# Patient Record
Sex: MALE | Race: WHITE | NOT HISPANIC OR LATINO | Employment: PART TIME | URBAN - METROPOLITAN AREA
[De-identification: names, ages, dates, MRNs, and addresses within clinical notes are randomized per-mention and may not be internally consistent; named-entity substitution may affect disease eponyms.]

---

## 2021-03-28 ENCOUNTER — HOSPITAL (OUTPATIENT)
Facility: CLINIC | Age: 71
End: 2021-03-28

## 2021-03-28 ENCOUNTER — HOSPITAL ENCOUNTER (EMERGENCY)
Facility: CLINIC | Age: 71
Discharge: ANOTHER HOSPITAL | End: 2021-03-28
Attending: EMERGENCY MEDICINE | Admitting: EMERGENCY MEDICINE
Payer: COMMERCIAL

## 2021-03-28 ENCOUNTER — APPOINTMENT (OUTPATIENT)
Dept: CT IMAGING | Facility: CLINIC | Age: 71
End: 2021-03-28
Attending: EMERGENCY MEDICINE
Payer: COMMERCIAL

## 2021-03-28 VITALS
SYSTOLIC BLOOD PRESSURE: 132 MMHG | RESPIRATION RATE: 26 BRPM | WEIGHT: 257 LBS | OXYGEN SATURATION: 91 % | HEIGHT: 69 IN | HEART RATE: 98 BPM | BODY MASS INDEX: 38.06 KG/M2 | DIASTOLIC BLOOD PRESSURE: 89 MMHG | TEMPERATURE: 98.9 F

## 2021-03-28 DIAGNOSIS — I26.09 ACUTE PULMONARY EMBOLISM WITH ACUTE COR PULMONALE, UNSPECIFIED PULMONARY EMBOLISM TYPE (H): ICD-10-CM

## 2021-03-28 LAB
ANION GAP SERPL CALCULATED.3IONS-SCNC: 9 MMOL/L (ref 3–14)
APTT PPP: 30 SEC (ref 22–37)
BASOPHILS # BLD AUTO: 0.1 10E9/L (ref 0–0.2)
BASOPHILS NFR BLD AUTO: 0.7 %
BUN SERPL-MCNC: 18 MG/DL (ref 7–30)
CALCIUM SERPL-MCNC: 8.4 MG/DL (ref 8.5–10.1)
CHLORIDE SERPL-SCNC: 105 MMOL/L (ref 94–109)
CO2 SERPL-SCNC: 25 MMOL/L (ref 20–32)
CREAT BLD-MCNC: 1.6 MG/DL (ref 0.66–1.25)
CREAT SERPL-MCNC: 1.51 MG/DL (ref 0.66–1.25)
D DIMER PPP FEU-MCNC: 13.3 UG/ML FEU (ref 0–0.5)
DIFFERENTIAL METHOD BLD: NORMAL
EOSINOPHIL # BLD AUTO: 0.5 10E9/L (ref 0–0.7)
EOSINOPHIL NFR BLD AUTO: 4.9 %
ERYTHROCYTE [DISTWIDTH] IN BLOOD BY AUTOMATED COUNT: 12.7 % (ref 10–15)
FLUAV RNA RESP QL NAA+PROBE: NEGATIVE
FLUBV RNA RESP QL NAA+PROBE: NEGATIVE
GFR SERPL CREATININE-BSD FRML MDRD: 43 ML/MIN/{1.73_M2}
GFR SERPL CREATININE-BSD FRML MDRD: 46 ML/MIN/{1.73_M2}
GLUCOSE SERPL-MCNC: 111 MG/DL (ref 70–99)
HCT VFR BLD AUTO: 43.4 % (ref 40–53)
HGB BLD-MCNC: 14.4 G/DL (ref 13.3–17.7)
IMM GRANULOCYTES # BLD: 0 10E9/L (ref 0–0.4)
IMM GRANULOCYTES NFR BLD: 0.3 %
LABORATORY COMMENT REPORT: NORMAL
LYMPHOCYTES # BLD AUTO: 2.8 10E9/L (ref 0.8–5.3)
LYMPHOCYTES NFR BLD AUTO: 26.5 %
MCH RBC QN AUTO: 32.2 PG (ref 26.5–33)
MCHC RBC AUTO-ENTMCNC: 33.2 G/DL (ref 31.5–36.5)
MCV RBC AUTO: 97 FL (ref 78–100)
MONOCYTES # BLD AUTO: 0.8 10E9/L (ref 0–1.3)
MONOCYTES NFR BLD AUTO: 7.5 %
NEUTROPHILS # BLD AUTO: 6.3 10E9/L (ref 1.6–8.3)
NEUTROPHILS NFR BLD AUTO: 60.1 %
NRBC # BLD AUTO: 0 10*3/UL
NRBC BLD AUTO-RTO: 0 /100
PLATELET # BLD AUTO: 187 10E9/L (ref 150–450)
POTASSIUM SERPL-SCNC: 3.8 MMOL/L (ref 3.4–5.3)
RADIOLOGIST FLAGS: ABNORMAL
RBC # BLD AUTO: 4.47 10E12/L (ref 4.4–5.9)
RSV RNA SPEC QL NAA+PROBE: NORMAL
SARS-COV-2 RNA RESP QL NAA+PROBE: NEGATIVE
SODIUM SERPL-SCNC: 139 MMOL/L (ref 133–144)
SPECIMEN SOURCE: NORMAL
TROPONIN I SERPL-MCNC: 0.06 UG/L (ref 0–0.04)
WBC # BLD AUTO: 10.5 10E9/L (ref 4–11)

## 2021-03-28 PROCEDURE — 85730 THROMBOPLASTIN TIME PARTIAL: CPT | Performed by: EMERGENCY MEDICINE

## 2021-03-28 PROCEDURE — 84484 ASSAY OF TROPONIN QUANT: CPT | Performed by: EMERGENCY MEDICINE

## 2021-03-28 PROCEDURE — 250N000011 HC RX IP 250 OP 636: Performed by: EMERGENCY MEDICINE

## 2021-03-28 PROCEDURE — 250N000013 HC RX MED GY IP 250 OP 250 PS 637: Performed by: EMERGENCY MEDICINE

## 2021-03-28 PROCEDURE — 87636 SARSCOV2 & INF A&B AMP PRB: CPT | Performed by: EMERGENCY MEDICINE

## 2021-03-28 PROCEDURE — 85379 FIBRIN DEGRADATION QUANT: CPT | Performed by: EMERGENCY MEDICINE

## 2021-03-28 PROCEDURE — 96374 THER/PROPH/DIAG INJ IV PUSH: CPT | Mod: 59

## 2021-03-28 PROCEDURE — 99285 EMERGENCY DEPT VISIT HI MDM: CPT | Mod: 25

## 2021-03-28 PROCEDURE — 250N000009 HC RX 250: Performed by: EMERGENCY MEDICINE

## 2021-03-28 PROCEDURE — C9803 HOPD COVID-19 SPEC COLLECT: HCPCS

## 2021-03-28 PROCEDURE — 80048 BASIC METABOLIC PNL TOTAL CA: CPT | Performed by: EMERGENCY MEDICINE

## 2021-03-28 PROCEDURE — 85025 COMPLETE CBC W/AUTO DIFF WBC: CPT | Performed by: EMERGENCY MEDICINE

## 2021-03-28 PROCEDURE — 82565 ASSAY OF CREATININE: CPT

## 2021-03-28 PROCEDURE — 71275 CT ANGIOGRAPHY CHEST: CPT

## 2021-03-28 PROCEDURE — 93005 ELECTROCARDIOGRAM TRACING: CPT

## 2021-03-28 RX ORDER — IOPAMIDOL 755 MG/ML
500 INJECTION, SOLUTION INTRAVASCULAR ONCE
Status: COMPLETED | OUTPATIENT
Start: 2021-03-28 | End: 2021-03-28

## 2021-03-28 RX ORDER — LEVOTHYROXINE SODIUM 75 UG/1
TABLET ORAL
COMMUNITY
Start: 2020-08-25

## 2021-03-28 RX ORDER — LISINOPRIL 20 MG/1
20 TABLET ORAL
COMMUNITY
Start: 2020-10-09 | End: 2021-10-09

## 2021-03-28 RX ORDER — TADALAFIL 5 MG/1
5 TABLET ORAL
Status: ON HOLD | COMMUNITY
Start: 2020-04-08 | End: 2021-03-29

## 2021-03-28 RX ORDER — ASPIRIN 81 MG/1
324 TABLET, CHEWABLE ORAL ONCE
Status: COMPLETED | OUTPATIENT
Start: 2021-03-28 | End: 2021-03-28

## 2021-03-28 RX ORDER — HEPARIN SODIUM 10000 [USP'U]/100ML
0-5000 INJECTION, SOLUTION INTRAVENOUS CONTINUOUS
Status: DISCONTINUED | OUTPATIENT
Start: 2021-03-28 | End: 2021-03-29 | Stop reason: HOSPADM

## 2021-03-28 RX ADMIN — HEPARIN SODIUM 1800 UNITS/HR: 10000 INJECTION, SOLUTION INTRAVENOUS at 20:17

## 2021-03-28 RX ADMIN — IOPAMIDOL 80 ML: 755 INJECTION, SOLUTION INTRAVENOUS at 19:29

## 2021-03-28 RX ADMIN — SODIUM CHLORIDE 90 ML: 9 INJECTION, SOLUTION INTRAVENOUS at 19:29

## 2021-03-28 RX ADMIN — ASPIRIN 324 MG: 81 TABLET, CHEWABLE ORAL at 19:02

## 2021-03-28 ASSESSMENT — ENCOUNTER SYMPTOMS
LIGHT-HEADEDNESS: 1
NAUSEA: 0
COUGH: 0
APPETITE CHANGE: 1
VOMITING: 0
ABDOMINAL PAIN: 0
FATIGUE: 1
HEADACHES: 1
FEVER: 0
SHORTNESS OF BREATH: 1

## 2021-03-28 ASSESSMENT — MIFFLIN-ST. JEOR: SCORE: 1911.12

## 2021-03-28 NOTE — ED PROVIDER NOTES
History     Chief Complaint:  Shortness of Breath and Syncope     HPI   Brian Bowers is a 71 year old male with history of hypertension, ANCA, psoriasis, GERD, prostate cancer, who presents for evaluation of shortness of breath and syncope. The patient reports that 4 days ago he started to develop fatigue and possibly mild shortness of breath. However, he states that yesterday he really started to feel unwell and his shortness of breath was more prominent. He was walking outside down some steps when he felt lightheaded and that he could no longer walk. He laid down and reportedly had a syncopal event for 2 minutes. EMS were called and when they evaluated him, he was tachycardic to 115 and had oxygen saturations at 92%. They recommended that he be evaluated in the ED at that time but he refused. The patient endorses decreased appetite and today, he has contineud to experience worsening shortness of breath, lightheadedness, and a mild pain to his left chest that he describes as dull. The patient endorses a headache earlier today but this is now resolving. He feels that his shortness of breath worsens with ambulation. The patient denies fever, nausea, vomiting, abdominal pain, cough, leg pain or swelling, urinary changes, or loss taste/smell. He has not had COVID-19 but reports that he received both his vaccinations with the last on March 11. There is no family history of PE/DVT and he does not take blood thinners.     Review of Systems   Constitutional: Positive for appetite change and fatigue. Negative for fever.   HENT:        No loss taste/smell   Respiratory: Positive for shortness of breath. Negative for cough.    Cardiovascular: Positive for chest pain. Negative for leg swelling.   Gastrointestinal: Negative for abdominal pain, nausea and vomiting.   Genitourinary:        No urinary changes   Musculoskeletal:        No leg pain   Neurological: Positive for syncope, light-headedness and headaches.   All  "other systems reviewed and are negative.      Allergies:  No Known Allergies    Medications:  Cialis   Viagra   Levothyroxine   Lisinopril   Omeprazole    Past Medical History:    GERD  Psoriasis   Anal fissure   ANCA  Prostate cancer   Obesity   hypogonadism   Hypertension  Erectile dysfunction   Basal cell carcinoma   dyspepsia     Past Surgical History:    Vasectomy  Colonoscopy  Prostate biopsy   Tonsillectomy  Hernia repair     Family History:    Father: prostate cancer  Mother: arthritis, emphysema     Social History:  The patient was unaccompanied to the ED.  Alcohol Use: Positive; occasional   PCP: [facility]    Physical Exam     Patient Vitals for the past 24 hrs:   BP Temp Pulse Resp SpO2 Height Weight   03/28/21 2245 (!) 117/95 -- 89 -- 90 % -- --   03/28/21 2215 (!) 134/94 -- 91 -- 90 % -- --   03/28/21 2200 (!) 141/94 -- 89 -- 91 % -- --   03/28/21 2145 (!) 132/97 -- 91 -- 91 % -- --   03/28/21 2130 (!) 129/97 -- 93 -- 91 % -- --   03/28/21 2115 (!) 149/91 -- 93 -- 91 % -- --   03/28/21 2100 -- -- 92 -- 91 % -- --   03/28/21 2045 (!) 165/108 -- 94 -- 93 % -- --   03/28/21 2030 (!) 166/114 -- 92 -- 94 % -- --   03/28/21 2015 (!) 166/110 -- 96 -- 93 % -- --   03/28/21 2000 (!) 162/101 -- 99 -- 94 % -- --   03/28/21 1958 -- -- -- -- -- 1.753 m (5' 9\") 116.6 kg (257 lb)   03/28/21 1945 (!) 144/96 -- 98 -- 93 % -- --   03/28/21 1941 -- -- 103 -- 95 % -- --   03/28/21 1940 -- -- 108 -- 91 % -- --   03/28/21 1938 (!) 174/98 -- 105 -- 90 % -- --   03/28/21 1915 126/83 -- 105 -- 91 % -- --   03/28/21 1900 125/79 -- 102 -- 91 % -- --   03/28/21 1845 120/81 -- 106 -- 92 % -- --   03/28/21 1825 124/82 98.9  F (37.2  C) 135 26 95 % -- --       Physical Exam    HENT:    Mouth/Throat: Oropharynx is without swelling or erythema. Oral mucosa moist.    Eyes: Conjunctivae are normal. No scleral icterus.  Neck: Neck supple. No cervical adenopathy  Cardiovascular: Tachycardic but regular rhythm and intact distal pulses.  "   Pulmonary/Chest: Increased respiratory rate. Increased work of breathing. Speaking in long phrases.  Lungs clear.   Abdominal: Soft.  No distension. There is no tenderness.   Musculoskeletal:  No edema, No calf tenderness  Neurological:Alert and answering questions appropriately. Coordination normal.   Skin: Skin is warm and dry.   Psychiatric: Normal mood and affect.     Emergency Department Course     ECG:  ECG taken at 1832, ECG read at 1845  Sinus tachycardia  Inferior infarct, age undetermined   Abnormal ECG  Rate 112 bpm. NM interval 192 ms. QRS duration 74 ms. QT/QTc 326/444 ms. P-R-T axes 33 -3 9.    Imaging:    CT Chest Pulmonary Embolism w Contrast  Abnormal  1.  Pulmonary embolism is present, large clot burden with evidence for elevated right heart pressure.  2.  Large hiatal hernia.      [Critical Result: Pulmonary embolism]    Finding was identified on 3/28/2021 7:43 PM.     Dr. Ponce was contacted by me on 3/28/2021 7:55 PM and verbalized understanding of the critical result.   Reading per radiology     Laboratory:    Symptomatic Influenza A/B & SARS-CoV2 (COVID-19) Virus PCR Multiplex: Negative       CBC: WBC 10.5, HGB 14.4,   BMP: Glucose 111 (H), GFR 46 (L), Calcium 8.4 (L)o/w WNL (Creatinine 1.51 (H))    Troponin (Collected 1837): 0.062 (H)    D Dimer: 13.3 (H)     Partial Thromboplastin Time: 30     Creatinine POCT: Creatinine 1.6 (H), GFR 43 (L)    Emergency Department Course:    Reviewed:    I reviewed the patient's nursing notes, vitals, past medical records, Care Everywhere.     Assessments:    1832 I performed an exam of the patient as documented above.     1958 Patient rechecked and updated.      Consults:     1951 I spoke with Dr. Wood of the radiology service from [facility] regarding patient's presentation, findings, and plan of care.     1956 I spoke with Dr. Howell of the IR service regarding patient's presentation, findings, and plan of care.     2005 I spoke  with Dr. Howell of the IR service regarding patient's presentation, findings, and plan of care.     2036 I spoke with Dr. Cortez of the hospitalist service from [facility] regarding patient's presentation, findings, and plan of care.     2141 I spoke with Dr. Butler of the hospitalist service from [facility] regarding patient's presentation, findings, and plan of care.     Interventions:  1902 Aspirin 324 mg PO  2016 Heparin 8,000 units IV  2017 Heparin 25,000 units in 0.45% NaCl, 1,800 units IV    Disposition:  The patient was transferred to [facility] via EMS. The patient was admitted under the care of Dr. Butler.     Impression & Plan          Covid-19  Brian Bowers was evaluated during a global COVID-19 pandemic, which necessitated consideration that the patient might be at risk for infection with the SARS-CoV-2 virus that causes COVID-19.   Applicable protocols for evaluation were followed during the patient's care.   COVID-19 was considered as part of the patient's evaluation. The plan for testing is:  a test was obtained during this visit.    Medical Decision Making:  Brian Bowers is a 71 year old male who presents to the emergency department today for evaluation of tachycardia, shortness of breath, and an episode of syncope yesterday. On arrival, he was tachycardic with normal oxygen saturations and signs of increased work of breathing. IV was placed, labs were drawn and sent, and EKG was obtained without findings found concerning for acute ischemia. He was exhibited to CT for CT pulmonary angiogram which revealed findings as noted above with significant clot burden and some elevated right heart pressures. He did have some decline in his oxygen saturations here but has now been stable on 2 L by nasal canula. He has remained hemodynamically stable he was heparinized. I consulted with Dr. Howell of the IR service to determined his appropriateness for embolectomy. She received images and  thought he was a possible candidate but did not feel it needed to be done emergently. Unfortunately, there was no available bed at [facility] for him to be transferred and we eventually found a bed at the [facility]. He has been accepted for admission under Dr. Butler. He will transferred via ambulance for direct admission.         Diagnosis:    ICD-10-CM    1. Acute pulmonary embolism with acute cor pulmonale, unspecified pulmonary embolism type (H)  I26.09 Partial thromboplastin time     Scribe Disclosure:  I, Orla Severson, am serving as a scribe at 6:29 PM on 3/28/2021 to document services personally performed by Renetta Ponce MD based on my observations and the provider's statements to me.     [facility] EMERGENCY DEPT         Renetta Ponce MD  03/29/21 0209

## 2021-03-28 NOTE — ED TRIAGE NOTES
Patient presents with complaints of SOB which started last night. Patient states he also had a syncopal episode last night as well. Patient state he is still having SOB today. ABC intact without need for intervention at this time.

## 2021-03-29 ENCOUNTER — APPOINTMENT (OUTPATIENT)
Dept: GENERAL RADIOLOGY | Facility: CLINIC | Age: 71
DRG: 164 | End: 2021-03-29
Attending: PHYSICIAN ASSISTANT
Payer: COMMERCIAL

## 2021-03-29 ENCOUNTER — HOSPITAL ENCOUNTER (INPATIENT)
Facility: CLINIC | Age: 71
LOS: 2 days | Discharge: HOME | DRG: 164 | End: 2021-03-31
Attending: STUDENT IN AN ORGANIZED HEALTH CARE EDUCATION/TRAINING PROGRAM | Admitting: INTERNAL MEDICINE
Payer: COMMERCIAL

## 2021-03-29 ENCOUNTER — APPOINTMENT (OUTPATIENT)
Dept: ULTRASOUND IMAGING | Facility: CLINIC | Age: 71
DRG: 164 | End: 2021-03-29
Attending: INTERNAL MEDICINE
Payer: COMMERCIAL

## 2021-03-29 ENCOUNTER — APPOINTMENT (OUTPATIENT)
Dept: INTERVENTIONAL RADIOLOGY/VASCULAR | Facility: CLINIC | Age: 71
DRG: 164 | End: 2021-03-29
Attending: STUDENT IN AN ORGANIZED HEALTH CARE EDUCATION/TRAINING PROGRAM
Payer: COMMERCIAL

## 2021-03-29 ENCOUNTER — APPOINTMENT (OUTPATIENT)
Dept: CARDIOLOGY | Facility: CLINIC | Age: 71
DRG: 164 | End: 2021-03-29
Attending: STUDENT IN AN ORGANIZED HEALTH CARE EDUCATION/TRAINING PROGRAM
Payer: COMMERCIAL

## 2021-03-29 DIAGNOSIS — I26.99 PE (PULMONARY THROMBOEMBOLISM) (H): Primary | ICD-10-CM

## 2021-03-29 DIAGNOSIS — K21.00 GASTROESOPHAGEAL REFLUX DISEASE WITH ESOPHAGITIS, UNSPECIFIED WHETHER HEMORRHAGE: ICD-10-CM

## 2021-03-29 LAB
ABO + RH BLD: NORMAL
ABO + RH BLD: NORMAL
ALBUMIN SERPL-MCNC: 2.7 G/DL (ref 3.4–5)
ALP SERPL-CCNC: 49 U/L (ref 40–150)
ALT SERPL W P-5'-P-CCNC: 27 U/L (ref 0–70)
ANION GAP SERPL CALCULATED.3IONS-SCNC: 7 MMOL/L (ref 3–14)
ANION GAP SERPL CALCULATED.3IONS-SCNC: 9 MMOL/L (ref 3–14)
ANION GAP SERPL CALCULATED.3IONS-SCNC: 9 MMOL/L (ref 3–14)
APTT PPP: 107 SEC (ref 22–37)
APTT PPP: 131 SEC (ref 22–37)
AST SERPL W P-5'-P-CCNC: 25 U/L (ref 0–45)
BILIRUB DIRECT SERPL-MCNC: <0.1 MG/DL (ref 0–0.2)
BILIRUB SERPL-MCNC: 0.5 MG/DL (ref 0.2–1.3)
BLD GP AB SCN SERPL QL: NORMAL
BLOOD BANK CMNT PATIENT-IMP: NORMAL
BUN SERPL-MCNC: 11 MG/DL (ref 7–30)
BUN SERPL-MCNC: 19 MG/DL (ref 7–30)
BUN SERPL-MCNC: 25 MG/DL (ref 7–30)
CALCIUM SERPL-MCNC: 8.1 MG/DL (ref 8.5–10.1)
CALCIUM SERPL-MCNC: 8.3 MG/DL (ref 8.5–10.1)
CALCIUM SERPL-MCNC: 8.8 MG/DL (ref 8.5–10.1)
CHLORIDE SERPL-SCNC: 106 MMOL/L (ref 94–109)
CHLORIDE SERPL-SCNC: 107 MMOL/L (ref 94–109)
CHLORIDE SERPL-SCNC: 108 MMOL/L (ref 94–109)
CO2 SERPL-SCNC: 20 MMOL/L (ref 20–32)
CO2 SERPL-SCNC: 22 MMOL/L (ref 20–32)
CO2 SERPL-SCNC: 25 MMOL/L (ref 20–32)
CREAT SERPL-MCNC: 0.77 MG/DL (ref 0.66–1.25)
CREAT SERPL-MCNC: 1.26 MG/DL (ref 0.66–1.25)
CREAT SERPL-MCNC: 1.49 MG/DL (ref 0.66–1.25)
ERYTHROCYTE [DISTWIDTH] IN BLOOD BY AUTOMATED COUNT: 12.9 % (ref 10–15)
ERYTHROCYTE [DISTWIDTH] IN BLOOD BY AUTOMATED COUNT: 12.9 % (ref 10–15)
ERYTHROCYTE [DISTWIDTH] IN BLOOD BY AUTOMATED COUNT: 13 % (ref 10–15)
GFR SERPL CREATININE-BSD FRML MDRD: 47 ML/MIN/{1.73_M2}
GFR SERPL CREATININE-BSD FRML MDRD: 57 ML/MIN/{1.73_M2}
GFR SERPL CREATININE-BSD FRML MDRD: >90 ML/MIN/{1.73_M2}
GLUCOSE SERPL-MCNC: 107 MG/DL (ref 70–99)
GLUCOSE SERPL-MCNC: 116 MG/DL (ref 70–99)
GLUCOSE SERPL-MCNC: 193 MG/DL (ref 70–99)
HCT VFR BLD AUTO: 35.9 % (ref 40–53)
HCT VFR BLD AUTO: 39.8 % (ref 40–53)
HCT VFR BLD AUTO: 41 % (ref 40–53)
HGB BLD-MCNC: 10.2 G/DL (ref 13.3–17.7)
HGB BLD-MCNC: 11.5 G/DL (ref 13.3–17.7)
HGB BLD-MCNC: 11.6 G/DL (ref 13.3–17.7)
HGB BLD-MCNC: 12.5 G/DL (ref 13.3–17.7)
HGB BLD-MCNC: 13.3 G/DL (ref 13.3–17.7)
INR PPP: 1.3 (ref 0.86–1.14)
INR PPP: 1.34 (ref 0.86–1.14)
INTERPRETATION ECG - MUSE: NORMAL
INTERPRETATION ECG - MUSE: NORMAL
KCT BLD-ACNC: 140 SEC (ref 75–150)
KCT BLD-ACNC: 233 SEC (ref 75–150)
KCT BLD-ACNC: 302 SEC (ref 75–150)
LACTATE BLD-SCNC: 1.1 MMOL/L (ref 0.7–2)
LACTATE BLD-SCNC: 2 MMOL/L (ref 0.7–2)
LACTATE BLD-SCNC: 2.5 MMOL/L (ref 0.7–2)
LACTATE BLD-SCNC: 3.9 MMOL/L (ref 0.7–2)
MAGNESIUM SERPL-MCNC: 2.1 MG/DL (ref 1.6–2.3)
MCH RBC QN AUTO: 31.4 PG (ref 26.5–33)
MCH RBC QN AUTO: 31.6 PG (ref 26.5–33)
MCH RBC QN AUTO: 31.8 PG (ref 26.5–33)
MCHC RBC AUTO-ENTMCNC: 31.4 G/DL (ref 31.5–36.5)
MCHC RBC AUTO-ENTMCNC: 32.3 G/DL (ref 31.5–36.5)
MCHC RBC AUTO-ENTMCNC: 32.4 G/DL (ref 31.5–36.5)
MCV RBC AUTO: 101 FL (ref 78–100)
MCV RBC AUTO: 97 FL (ref 78–100)
MCV RBC AUTO: 98 FL (ref 78–100)
NT-PROBNP SERPL-MCNC: 1263 PG/ML (ref 0–900)
PHOSPHATE SERPL-MCNC: 3 MG/DL (ref 2.5–4.5)
PLATELET # BLD AUTO: 157 10E9/L (ref 150–450)
PLATELET # BLD AUTO: 172 10E9/L (ref 150–450)
PLATELET # BLD AUTO: 180 10E9/L (ref 150–450)
POTASSIUM SERPL-SCNC: 3.2 MMOL/L (ref 3.4–5.3)
POTASSIUM SERPL-SCNC: 3.8 MMOL/L (ref 3.4–5.3)
POTASSIUM SERPL-SCNC: 4 MMOL/L (ref 3.4–5.3)
PROT SERPL-MCNC: 6.5 G/DL (ref 6.8–8.8)
RADIOLOGIST FLAGS: ABNORMAL
RBC # BLD AUTO: 3.67 10E12/L (ref 4.4–5.9)
RBC # BLD AUTO: 3.93 10E12/L (ref 4.4–5.9)
RBC # BLD AUTO: 4.23 10E12/L (ref 4.4–5.9)
SODIUM SERPL-SCNC: 137 MMOL/L (ref 133–144)
SODIUM SERPL-SCNC: 138 MMOL/L (ref 133–144)
SODIUM SERPL-SCNC: 139 MMOL/L (ref 133–144)
SPECIMEN EXP DATE BLD: NORMAL
TROPONIN I SERPL-MCNC: 0.06 UG/L (ref 0–0.04)
TROPONIN I SERPL-MCNC: 0.12 UG/L (ref 0–0.04)
TROPONIN I SERPL-MCNC: 0.17 UG/L (ref 0–0.04)
TROPONIN I SERPL-MCNC: 0.62 UG/L (ref 0–0.04)
UFH PPP CHRO-ACNC: 0.53 IU/ML
UFH PPP CHRO-ACNC: 0.66 IU/ML
UFH PPP CHRO-ACNC: >1.1 IU/ML
UFH PPP CHRO-ACNC: >1.1 IU/ML
WBC # BLD AUTO: 13.1 10E9/L (ref 4–11)
WBC # BLD AUTO: 15.4 10E9/L (ref 4–11)
WBC # BLD AUTO: 9.1 10E9/L (ref 4–11)

## 2021-03-29 PROCEDURE — 999N000127 HC STATISTIC PERIPHERAL IV START W US GUIDANCE

## 2021-03-29 PROCEDURE — 99152 MOD SED SAME PHYS/QHP 5/>YRS: CPT

## 2021-03-29 PROCEDURE — 37185 PRIM ART M-THRMBC SBSQ VSL: CPT | Mod: XS | Performed by: RADIOLOGY

## 2021-03-29 PROCEDURE — 93970 EXTREMITY STUDY: CPT | Mod: 26 | Performed by: RADIOLOGY

## 2021-03-29 PROCEDURE — 83605 ASSAY OF LACTIC ACID: CPT | Performed by: INTERNAL MEDICINE

## 2021-03-29 PROCEDURE — 93321 DOPPLER ECHO F-UP/LMTD STD: CPT | Mod: 26 | Performed by: INTERNAL MEDICINE

## 2021-03-29 PROCEDURE — 02CR3ZZ EXTIRPATION OF MATTER FROM LEFT PULMONARY ARTERY, PERCUTANEOUS APPROACH: ICD-10-PCS | Performed by: RADIOLOGY

## 2021-03-29 PROCEDURE — C1769 GUIDE WIRE: HCPCS

## 2021-03-29 PROCEDURE — 71045 X-RAY EXAM CHEST 1 VIEW: CPT | Mod: 26 | Performed by: RADIOLOGY

## 2021-03-29 PROCEDURE — 272N000563 HC SHEATH CR14

## 2021-03-29 PROCEDURE — 86900 BLOOD TYPING SEROLOGIC ABO: CPT | Performed by: INTERNAL MEDICINE

## 2021-03-29 PROCEDURE — 93970 EXTREMITY STUDY: CPT

## 2021-03-29 PROCEDURE — 37184 PRIM ART M-THRMBC 1ST VSL: CPT | Mod: 50 | Performed by: RADIOLOGY

## 2021-03-29 PROCEDURE — 250N000009 HC RX 250: Performed by: STUDENT IN AN ORGANIZED HEALTH CARE EDUCATION/TRAINING PROGRAM

## 2021-03-29 PROCEDURE — 93010 ELECTROCARDIOGRAM REPORT: CPT | Performed by: INTERNAL MEDICINE

## 2021-03-29 PROCEDURE — 84484 ASSAY OF TROPONIN QUANT: CPT | Performed by: PHYSICIAN ASSISTANT

## 2021-03-29 PROCEDURE — 36015 PLACE CATHETER IN ARTERY: CPT | Mod: RT

## 2021-03-29 PROCEDURE — 272N000569 HC SHEATH CR6

## 2021-03-29 PROCEDURE — 99153 MOD SED SAME PHYS/QHP EA: CPT

## 2021-03-29 PROCEDURE — 36415 COLL VENOUS BLD VENIPUNCTURE: CPT | Performed by: INTERNAL MEDICINE

## 2021-03-29 PROCEDURE — 272N000567 HC SHEATH CR4

## 2021-03-29 PROCEDURE — 71045 X-RAY EXAM CHEST 1 VIEW: CPT

## 2021-03-29 PROCEDURE — 83735 ASSAY OF MAGNESIUM: CPT | Performed by: INTERNAL MEDICINE

## 2021-03-29 PROCEDURE — 37184 PRIM ART M-THRMBC 1ST VSL: CPT | Mod: 50

## 2021-03-29 PROCEDURE — 272N000192 HC ACCESSORY CR2

## 2021-03-29 PROCEDURE — 80076 HEPATIC FUNCTION PANEL: CPT | Performed by: INTERNAL MEDICINE

## 2021-03-29 PROCEDURE — 85520 HEPARIN ASSAY: CPT | Performed by: INTERNAL MEDICINE

## 2021-03-29 PROCEDURE — 272N000504 HC NEEDLE CR4

## 2021-03-29 PROCEDURE — 83605 ASSAY OF LACTIC ACID: CPT | Performed by: PHYSICIAN ASSISTANT

## 2021-03-29 PROCEDURE — C1757 CATH, THROMBECTOMY/EMBOLECT: HCPCS

## 2021-03-29 PROCEDURE — 99223 1ST HOSP IP/OBS HIGH 75: CPT | Mod: AI | Performed by: INTERNAL MEDICINE

## 2021-03-29 PROCEDURE — 02CQ3ZZ EXTIRPATION OF MATTER FROM RIGHT PULMONARY ARTERY, PERCUTANEOUS APPROACH: ICD-10-PCS | Performed by: RADIOLOGY

## 2021-03-29 PROCEDURE — 93005 ELECTROCARDIOGRAM TRACING: CPT

## 2021-03-29 PROCEDURE — 120N000003 HC R&B IMCU UMMC

## 2021-03-29 PROCEDURE — 83880 ASSAY OF NATRIURETIC PEPTIDE: CPT | Performed by: INTERNAL MEDICINE

## 2021-03-29 PROCEDURE — 250N000013 HC RX MED GY IP 250 OP 250 PS 637: Performed by: NURSE PRACTITIONER

## 2021-03-29 PROCEDURE — 255N000002 HC RX 255 OP 636: Performed by: RADIOLOGY

## 2021-03-29 PROCEDURE — 258N000003 HC RX IP 258 OP 636: Performed by: PHYSICIAN ASSISTANT

## 2021-03-29 PROCEDURE — 93308 TTE F-UP OR LMTD: CPT | Mod: 26 | Performed by: INTERNAL MEDICINE

## 2021-03-29 PROCEDURE — 85027 COMPLETE CBC AUTOMATED: CPT | Performed by: INTERNAL MEDICINE

## 2021-03-29 PROCEDURE — 999N000157 HC STATISTIC RCP TIME EA 10 MIN

## 2021-03-29 PROCEDURE — C9113 INJ PANTOPRAZOLE SODIUM, VIA: HCPCS | Performed by: INTERNAL MEDICINE

## 2021-03-29 PROCEDURE — 85018 HEMOGLOBIN: CPT | Performed by: PHYSICIAN ASSISTANT

## 2021-03-29 PROCEDURE — 250N000011 HC RX IP 250 OP 636: Performed by: INTERNAL MEDICINE

## 2021-03-29 PROCEDURE — 99152 MOD SED SAME PHYS/QHP 5/>YRS: CPT | Mod: GC | Performed by: RADIOLOGY

## 2021-03-29 PROCEDURE — 272N000149 HC KIT CR9

## 2021-03-29 PROCEDURE — 250N000011 HC RX IP 250 OP 636: Performed by: STUDENT IN AN ORGANIZED HEALTH CARE EDUCATION/TRAINING PROGRAM

## 2021-03-29 PROCEDURE — 99207 PR APP CREDIT; MD BILLING SHARED VISIT: CPT | Performed by: INTERNAL MEDICINE

## 2021-03-29 PROCEDURE — 272N000566 HC SHEATH CR3

## 2021-03-29 PROCEDURE — 272N000143 HC KIT CR3

## 2021-03-29 PROCEDURE — 36415 COLL VENOUS BLD VENIPUNCTURE: CPT | Performed by: PHYSICIAN ASSISTANT

## 2021-03-29 PROCEDURE — 86901 BLOOD TYPING SEROLOGIC RH(D): CPT | Performed by: INTERNAL MEDICINE

## 2021-03-29 PROCEDURE — 258N000003 HC RX IP 258 OP 636: Performed by: INTERNAL MEDICINE

## 2021-03-29 PROCEDURE — C1887 CATHETER, GUIDING: HCPCS

## 2021-03-29 PROCEDURE — 93325 DOPPLER ECHO COLOR FLOW MAPG: CPT | Mod: 26 | Performed by: INTERNAL MEDICINE

## 2021-03-29 PROCEDURE — 85018 HEMOGLOBIN: CPT | Performed by: INTERNAL MEDICINE

## 2021-03-29 PROCEDURE — 255N000002 HC RX 255 OP 636: Performed by: INTERNAL MEDICINE

## 2021-03-29 PROCEDURE — 272N000508 HC NEEDLE CR8

## 2021-03-29 PROCEDURE — 99222 1ST HOSP IP/OBS MODERATE 55: CPT | Mod: GC | Performed by: INTERNAL MEDICINE

## 2021-03-29 PROCEDURE — 75743 ARTERY X-RAYS LUNGS: CPT

## 2021-03-29 PROCEDURE — 80048 BASIC METABOLIC PNL TOTAL CA: CPT | Performed by: INTERNAL MEDICINE

## 2021-03-29 PROCEDURE — 250N000013 HC RX MED GY IP 250 OP 250 PS 637: Performed by: INTERNAL MEDICINE

## 2021-03-29 PROCEDURE — 76937 US GUIDE VASCULAR ACCESS: CPT | Mod: 26 | Performed by: RADIOLOGY

## 2021-03-29 PROCEDURE — 99207 PR APP CREDIT; MD BILLING SHARED VISIT: CPT | Performed by: PHYSICIAN ASSISTANT

## 2021-03-29 PROCEDURE — 36015 PLACE CATHETER IN ARTERY: CPT | Mod: XS | Performed by: RADIOLOGY

## 2021-03-29 PROCEDURE — 250N000011 HC RX IP 250 OP 636: Performed by: RADIOLOGY

## 2021-03-29 PROCEDURE — 85730 THROMBOPLASTIN TIME PARTIAL: CPT | Performed by: INTERNAL MEDICINE

## 2021-03-29 PROCEDURE — 75774 ARTERY X-RAY EACH VESSEL: CPT | Mod: LT

## 2021-03-29 PROCEDURE — 86850 RBC ANTIBODY SCREEN: CPT | Performed by: INTERNAL MEDICINE

## 2021-03-29 PROCEDURE — 99223 1ST HOSP IP/OBS HIGH 75: CPT | Mod: 25 | Performed by: STUDENT IN AN ORGANIZED HEALTH CARE EDUCATION/TRAINING PROGRAM

## 2021-03-29 PROCEDURE — 84100 ASSAY OF PHOSPHORUS: CPT | Performed by: INTERNAL MEDICINE

## 2021-03-29 PROCEDURE — 999N000208 ECHOCARDIOGRAM LIMITED

## 2021-03-29 PROCEDURE — 85610 PROTHROMBIN TIME: CPT | Performed by: INTERNAL MEDICINE

## 2021-03-29 PROCEDURE — 85347 COAGULATION TIME ACTIVATED: CPT

## 2021-03-29 PROCEDURE — 84484 ASSAY OF TROPONIN QUANT: CPT | Performed by: INTERNAL MEDICINE

## 2021-03-29 PROCEDURE — 36014 PLACE CATHETER IN ARTERY: CPT | Mod: 50

## 2021-03-29 RX ORDER — AMOXICILLIN 250 MG
1 CAPSULE ORAL AT BEDTIME
Status: DISCONTINUED | OUTPATIENT
Start: 2021-03-29 | End: 2021-03-31 | Stop reason: HOSPADM

## 2021-03-29 RX ORDER — LIDOCAINE 40 MG/G
CREAM TOPICAL
Status: DISCONTINUED | OUTPATIENT
Start: 2021-03-29 | End: 2021-03-31 | Stop reason: HOSPADM

## 2021-03-29 RX ORDER — NALOXONE HYDROCHLORIDE 0.4 MG/ML
0.4 INJECTION, SOLUTION INTRAMUSCULAR; INTRAVENOUS; SUBCUTANEOUS
Status: DISCONTINUED | OUTPATIENT
Start: 2021-03-29 | End: 2021-03-31 | Stop reason: HOSPADM

## 2021-03-29 RX ORDER — SODIUM CHLORIDE, SODIUM LACTATE, POTASSIUM CHLORIDE, CALCIUM CHLORIDE 600; 310; 30; 20 MG/100ML; MG/100ML; MG/100ML; MG/100ML
INJECTION, SOLUTION INTRAVENOUS CONTINUOUS
Status: DISCONTINUED | OUTPATIENT
Start: 2021-03-29 | End: 2021-03-29

## 2021-03-29 RX ORDER — NALOXONE HYDROCHLORIDE 0.4 MG/ML
0.2 INJECTION, SOLUTION INTRAMUSCULAR; INTRAVENOUS; SUBCUTANEOUS
Status: DISCONTINUED | OUTPATIENT
Start: 2021-03-29 | End: 2021-03-29 | Stop reason: HOSPADM

## 2021-03-29 RX ORDER — ACETAMINOPHEN 325 MG/1
650 TABLET ORAL EVERY 4 HOURS PRN
Status: DISCONTINUED | OUTPATIENT
Start: 2021-03-29 | End: 2021-03-31 | Stop reason: HOSPADM

## 2021-03-29 RX ORDER — ONDANSETRON 2 MG/ML
4 INJECTION INTRAMUSCULAR; INTRAVENOUS EVERY 6 HOURS PRN
Status: DISCONTINUED | OUTPATIENT
Start: 2021-03-29 | End: 2021-03-31 | Stop reason: HOSPADM

## 2021-03-29 RX ORDER — OXYCODONE HYDROCHLORIDE 5 MG/1
5-10 TABLET ORAL EVERY 4 HOURS PRN
Status: DISCONTINUED | OUTPATIENT
Start: 2021-03-29 | End: 2021-03-31 | Stop reason: HOSPADM

## 2021-03-29 RX ORDER — NALOXONE HYDROCHLORIDE 0.4 MG/ML
0.2 INJECTION, SOLUTION INTRAMUSCULAR; INTRAVENOUS; SUBCUTANEOUS
Status: DISCONTINUED | OUTPATIENT
Start: 2021-03-29 | End: 2021-03-31 | Stop reason: HOSPADM

## 2021-03-29 RX ORDER — IODIXANOL 320 MG/ML
150 INJECTION, SOLUTION INTRAVASCULAR ONCE
Status: COMPLETED | OUTPATIENT
Start: 2021-03-29 | End: 2021-03-29

## 2021-03-29 RX ORDER — LEVOTHYROXINE SODIUM 75 UG/1
75 TABLET ORAL
Status: DISCONTINUED | OUTPATIENT
Start: 2021-03-29 | End: 2021-03-31 | Stop reason: HOSPADM

## 2021-03-29 RX ORDER — HEPARIN SODIUM 1000 [USP'U]/ML
500-6000 INJECTION, SOLUTION INTRAVENOUS; SUBCUTANEOUS
Status: COMPLETED | OUTPATIENT
Start: 2021-03-29 | End: 2021-03-29

## 2021-03-29 RX ORDER — FENTANYL CITRATE 50 UG/ML
25-50 INJECTION, SOLUTION INTRAMUSCULAR; INTRAVENOUS EVERY 5 MIN PRN
Status: DISCONTINUED | OUTPATIENT
Start: 2021-03-29 | End: 2021-03-29 | Stop reason: HOSPADM

## 2021-03-29 RX ORDER — EMOLLIENT BASE
CREAM (GRAM) TOPICAL EVERY 6 HOURS PRN
Status: DISCONTINUED | OUTPATIENT
Start: 2021-03-29 | End: 2021-03-31 | Stop reason: HOSPADM

## 2021-03-29 RX ORDER — LANOLIN ALCOHOL/MO/W.PET/CERES
3 CREAM (GRAM) TOPICAL
Status: DISCONTINUED | OUTPATIENT
Start: 2021-03-29 | End: 2021-03-30

## 2021-03-29 RX ORDER — POLYETHYLENE GLYCOL 3350 17 G/17G
17 POWDER, FOR SOLUTION ORAL DAILY PRN
Status: DISCONTINUED | OUTPATIENT
Start: 2021-03-29 | End: 2021-03-31 | Stop reason: HOSPADM

## 2021-03-29 RX ORDER — NALOXONE HYDROCHLORIDE 0.4 MG/ML
0.4 INJECTION, SOLUTION INTRAMUSCULAR; INTRAVENOUS; SUBCUTANEOUS
Status: DISCONTINUED | OUTPATIENT
Start: 2021-03-29 | End: 2021-03-29 | Stop reason: HOSPADM

## 2021-03-29 RX ORDER — ONDANSETRON 4 MG/1
4 TABLET, ORALLY DISINTEGRATING ORAL EVERY 6 HOURS PRN
Status: DISCONTINUED | OUTPATIENT
Start: 2021-03-29 | End: 2021-03-31 | Stop reason: HOSPADM

## 2021-03-29 RX ORDER — ACETAMINOPHEN 500 MG
500 TABLET ORAL EVERY 6 HOURS PRN
Status: DISCONTINUED | OUTPATIENT
Start: 2021-03-29 | End: 2021-03-29

## 2021-03-29 RX ORDER — HEPARIN SODIUM 200 [USP'U]/100ML
1 INJECTION, SOLUTION INTRAVENOUS CONTINUOUS PRN
Status: DISCONTINUED | OUTPATIENT
Start: 2021-03-29 | End: 2021-03-29 | Stop reason: HOSPADM

## 2021-03-29 RX ORDER — LISINOPRIL 10 MG/1
20 TABLET ORAL DAILY
Status: DISCONTINUED | OUTPATIENT
Start: 2021-03-30 | End: 2021-03-31 | Stop reason: HOSPADM

## 2021-03-29 RX ORDER — FLUMAZENIL 0.1 MG/ML
0.2 INJECTION, SOLUTION INTRAVENOUS
Status: DISCONTINUED | OUTPATIENT
Start: 2021-03-29 | End: 2021-03-29 | Stop reason: HOSPADM

## 2021-03-29 RX ORDER — HEPARIN SODIUM 1000 [USP'U]/ML
7000 INJECTION, SOLUTION INTRAVENOUS; SUBCUTANEOUS
Status: COMPLETED | OUTPATIENT
Start: 2021-03-29 | End: 2021-03-29

## 2021-03-29 RX ORDER — HEPARIN SODIUM 10000 [USP'U]/100ML
0-5000 INJECTION, SOLUTION INTRAVENOUS CONTINUOUS
Status: DISCONTINUED | OUTPATIENT
Start: 2021-03-29 | End: 2021-03-30

## 2021-03-29 RX ADMIN — HEPARIN SODIUM 1 BAG: 200 INJECTION, SOLUTION INTRAVENOUS at 03:05

## 2021-03-29 RX ADMIN — ONDANSETRON 4 MG: 2 INJECTION INTRAMUSCULAR; INTRAVENOUS at 08:06

## 2021-03-29 RX ADMIN — HEPARIN SODIUM 1800 UNITS/HR: 10000 INJECTION, SOLUTION INTRAVENOUS at 09:50

## 2021-03-29 RX ADMIN — MIDAZOLAM 1 MG: 1 INJECTION INTRAMUSCULAR; INTRAVENOUS at 04:14

## 2021-03-29 RX ADMIN — SODIUM CHLORIDE, POTASSIUM CHLORIDE, SODIUM LACTATE AND CALCIUM CHLORIDE: 600; 310; 30; 20 INJECTION, SOLUTION INTRAVENOUS at 05:04

## 2021-03-29 RX ADMIN — DOCUSATE SODIUM 50 MG AND SENNOSIDES 8.6 MG 1 TABLET: 8.6; 5 TABLET, FILM COATED ORAL at 20:56

## 2021-03-29 RX ADMIN — IODIXANOL 150 ML: 320 INJECTION, SOLUTION INTRAVASCULAR at 04:32

## 2021-03-29 RX ADMIN — FENTANYL CITRATE 50 MCG: 50 INJECTION, SOLUTION INTRAMUSCULAR; INTRAVENOUS at 02:56

## 2021-03-29 RX ADMIN — HEPARIN SODIUM 7000 UNITS: 1000 INJECTION INTRAVENOUS; SUBCUTANEOUS at 03:01

## 2021-03-29 RX ADMIN — HUMAN ALBUMIN MICROSPHERES AND PERFLUTREN 6 ML: 10; .22 INJECTION, SOLUTION INTRAVENOUS at 11:03

## 2021-03-29 RX ADMIN — FENTANYL CITRATE 50 MCG: 50 INJECTION, SOLUTION INTRAMUSCULAR; INTRAVENOUS at 04:14

## 2021-03-29 RX ADMIN — HEPARIN SODIUM 1800 UNITS/HR: 10000 INJECTION, SOLUTION INTRAVENOUS at 01:08

## 2021-03-29 RX ADMIN — HEPARIN SODIUM 7000 UNITS: 1000 INJECTION INTRAVENOUS; SUBCUTANEOUS at 03:40

## 2021-03-29 RX ADMIN — PANTOPRAZOLE SODIUM 40 MG: 40 INJECTION, POWDER, FOR SOLUTION INTRAVENOUS at 20:26

## 2021-03-29 RX ADMIN — FENTANYL CITRATE 50 MCG: 50 INJECTION, SOLUTION INTRAMUSCULAR; INTRAVENOUS at 02:40

## 2021-03-29 RX ADMIN — SODIUM CHLORIDE, POTASSIUM CHLORIDE, SODIUM LACTATE AND CALCIUM CHLORIDE 1000 ML: 600; 310; 30; 20 INJECTION, SOLUTION INTRAVENOUS at 09:46

## 2021-03-29 RX ADMIN — LIDOCAINE HYDROCHLORIDE 10 ML: 10 INJECTION, SOLUTION EPIDURAL; INFILTRATION; INTRACAUDAL; PERINEURAL at 02:15

## 2021-03-29 RX ADMIN — MIDAZOLAM 1 MG: 1 INJECTION INTRAMUSCULAR; INTRAVENOUS at 02:56

## 2021-03-29 RX ADMIN — MIDAZOLAM 1 MG: 1 INJECTION INTRAMUSCULAR; INTRAVENOUS at 02:14

## 2021-03-29 RX ADMIN — MIDAZOLAM 1 MG: 1 INJECTION INTRAMUSCULAR; INTRAVENOUS at 02:40

## 2021-03-29 RX ADMIN — HEPARIN SODIUM 1500 UNITS/HR: 10000 INJECTION, SOLUTION INTRAVENOUS at 18:38

## 2021-03-29 RX ADMIN — LEVOTHYROXINE SODIUM 75 MCG: 75 TABLET ORAL at 08:26

## 2021-03-29 RX ADMIN — PANTOPRAZOLE SODIUM 40 MG: 40 INJECTION, POWDER, FOR SOLUTION INTRAVENOUS at 08:21

## 2021-03-29 RX ADMIN — FENTANYL CITRATE 50 MCG: 50 INJECTION, SOLUTION INTRAMUSCULAR; INTRAVENOUS at 02:15

## 2021-03-29 ASSESSMENT — ACTIVITIES OF DAILY LIVING (ADL)
ADLS_ACUITY_SCORE: 19
ADLS_ACUITY_SCORE: 17

## 2021-03-29 ASSESSMENT — MIFFLIN-ST. JEOR: SCORE: 1924.38

## 2021-03-29 NOTE — PROVIDER NOTIFICATION
03/29/21 1550   Oxygen Therapy   SpO2 95 %   O2 Device Nasal cannula   Oxygen Delivery 2 LPM  (titrated to 2 L-NC from 7 L-oxymask and saturation is 95%. )

## 2021-03-29 NOTE — PLAN OF CARE
[id number]:    Alert and oriented. Forgetful. VSS with oxymask on at 6L. SR to ST. HR 90s-100s.    LS coarse s/p thrombectomy, BS hypoactive X4, CMS intact. R groin site WDL, soft/no hematoma, dressing has small serosanguinous drainage, Woggle suture tightened x2, RLE CMS intact. PIV in R arm patent & infusing Heparin gtt @ 1500 units/hr. Tolerating clear liquid diet with no nausea/emesis. Voiding clear mendoza urine via urinal, passing gas, small BM this morning. Pt is on strict bedrest until 1600, then able to be up with assist. Has call light within reach, able to make needs known, will continue to monitor per POC.        PT went down to ultrasound this morning. While down there, pt vomited blood. Pt transfer up to unit right away and appears well. VSS with oxymask on at 6L stats at 92%. Pt was ordered to be bedrest until 8am. When commode arrived, pt wanted to use the commode. Nurse and NA assisted pt onto the commode. While getting up to the commode pt c/o a little lightheaded. Pt sat on edge of bed for a few seconds before standing up. Pt denied nausea or dizziness. Educated pt to call when done as nurse stayed by door. About 6 to 7 minutes, pt used the call light for assistance. Nurse and NA entered room to assist pt around 930am. NA noticed pt dropped his oxymask on the floor in front of him. NA grab the oxymask while nurse was next to pt. Nurse noticed pt started to faint tilting to his right side. Code [employer] was paged 9:35am.    While getting pt off the commode with the sling, pt started to cough and move. Another nurse was not able to feel a pulse but did feel the pulse right away when pt coughed and moved. Syncope episode happened for about 10-20 seconds minutes. Pt was alert when placed on bed. VSS were in the 90s/80s and trended back up. HR was in the 150s. [employer] printed out strip and it indicated pt HR dropped into the 60s for 20 seconds and increased to 150s. Labs were ordered, chest xray, and echo.  Heparin restarted at 1800U/HR.    Labs came back: 10A >1.10- MD was paged, Hep was stopped for 1 hour and restarted at 1500. 10A recheck is scheduled at 530pm.   and Trop was 0.620. Team aware. Cardiology following and wanted fluid to be stopped. Thinking about diuresis pt. BLE ultrasound show DVT in L femoral vein. Pt denies pain. Doing well now. GF at bedside.

## 2021-03-29 NOTE — CONSULTS
[facility]    Cardiology Consult Note-PERT      Date of Admission:  3/29/2021  Consult Requested by: Dr. Tam  Reason for Consult: Massive PE with Syncope    Assessment & Plan: HVSL   Brian Bowers is a 71 year old male with history of HTN, ANCA, BMI of 38, prostate CA, and GERD who presented to an outside ED with 10+ days of progressive dyspnea and fatigue with LOC earlier today while walking. Patient reports worsening dyspnea at first just with heavier exertion then to the point of sometimes even just at rest in the last couple days. Despite having no prior cardiac history, he was worried it could be related to his heart, but is nervous about hospitals, so he did not get it evaluated right away. Patient reports while walking earlier, he felt lightheaded and per reports to the outside ED, his girlfriend reported he passed out and fell to the ground. He was unconscious for approximately two minutes before he awoke spontaneously. EMS was called and found him to be tachycardic to low 100s and sating 92% on RA. He was placed on supplemental O2 and brought to an outside ED, where he was found to have acute bilateral PE with large clot burden and RV strain. Troponin was elevated to 0.06. He was started on a high intensity heparin gtt and sent to [facility] for further management. Upon arrival to [facility], PERT team was activated to discuss further recommendations.    # Acute Massive Bilateral PE with Significant Clot [place], RV strain, and Unprovoked  # Syncope  Currently HD stable and sating well on 2L NC. No prior hx of VTE, but did have history of prostate CA years prior. No other significant provoking risk factors. EKG showed NSR with no acute ST-T changes. Troponin elevated to 0.06. Bedside limited TTE was technically difficult given body habitus, but showed at least moderate RV dilation with moderately reduced RV function and grossly normal LVEF with septal flattening. Mike  score shows patient is at high risk especially in light of syncopal episode on 3/28.  - Agree with high intensity heparin infusion  - IR planning mechanical thrombectomy +/- direct lytics, currently NPO  - Given hemodynamic stability, no acute indications for MCS  - B/L LE US to assess for DVT ordered  - Formal TTE in the AM (ordered)  - Pending IR procedures, will transition from heparin to either warfarin vs DOAC in the coming days  - Continue to monitor on tele for now     Patient's case will be formally staffed in the AM.    Elvia Parker MD   Cardiology Fellow  [facility]  Pager: [phone]      ______________________________________________________________________    Chief Complaint   Dyspnea    History is obtained from the patient.    History of Present Illness   Brian Bowers is a 71 year old male with history of HTN, ANCA, BMI of 38, prostate CA, and GERD who presented to an outside ED with 10+ days of progressive dyspnea and fatigue with LOC earlier today while walking. Patient reports worsening dyspnea at first just with heavier exertion then to the point of sometimes even just at rest in the last couple days. Despite having no prior cardiac history, he was worried it could be related to his heart, but is nervous about hospitals, so he did not get it evaluated right away. Patient reports he was going to go out to dinner with his girlfriend today, when walking out to his car, he felt lightheaded and per reports to the outside ED, his girlfriend reports he passed out and fell to the ground. He was unconscious for approximately two minutes before he awoke spontaneously. EMS was called and found him to be tachycardic to low 100s and sating 92% on RA. He was placed on supplemental O2 and brought to an outside ED, where he was found to have acute bilateral PE with large clot burden and RV strain. Troponin was elevated to 0.06. He was started on a high intensity  heparin gtt and sent to [facility] for further management. Upon arrival to [facility], PERT team was activated to discuss further recommendations.    Patient denies recent palpitations, weight change, chest pain, abdominal pain, n/v/d/c, LE edema or pain, prolonged car rides, travel, surgery, change in medication, or immobility.    Review of Systems   The 10 point Review of Systems is negative other than noted in the HPI or here.    Past Medical History    I have reviewed this patient's medical history and updated it with pertinent information if needed.   Past Medical History:   Diagnosis Date     GERD (gastroesophageal reflux disease)        Past Surgical History   I have reviewed this patient's surgical history and updated it with pertinent information if needed.  No past surgical history on file.    Social History   I have reviewed this patient's social history and updated it with pertinent information if needed.  Social History     Tobacco Use     Smoking status: Never Smoker   Substance Use Topics     Alcohol use: Yes     Drug use: No     Family History   I have reviewed this patient's family history and updated it with pertinent information if needed.   I have reviewed this patient's family history and updated it with pertinent information if needed.  Family History   Problem Relation Age of Onset     Prostate Cancer Father    No family history of VTE.    Medications   Current Facility-Administered Medications   Medication     acetaminophen (TYLENOL) tablet 650 mg     emollient (VANICREAM) cream     heparin 25,000 units in 0.45% NaCl 250 mL ANTICOAGULANT infusion     lactated ringers infusion     levothyroxine (SYNTHROID/LEVOTHROID) tablet 75 mcg     lidocaine (LMX4) cream     lidocaine 1 % 0.1-1 mL     [START ON 3/30/2021] lisinopril (ZESTRIL) tablet 20 mg     melatonin tablet 1 mg     naloxone (NARCAN) injection 0.2 mg    Or     naloxone (NARCAN) injection 0.4 mg    Or     naloxone (NARCAN) injection 0.2 mg    Or      naloxone (NARCAN) injection 0.4 mg     omeprazole (priLOSEC) CR capsule 20 mg     ondansetron (ZOFRAN-ODT) ODT tab 4 mg    Or     ondansetron (ZOFRAN) injection 4 mg     oxyCODONE (ROXICODONE) tablet 5-10 mg     Patient is already receiving anticoagulation with heparin, enoxaparin (LOVENOX), warfarin (COUMADIN)  or other anticoagulant medication     polyethylene glycol (MIRALAX) Packet 17 g     sodium chloride (PF) 0.9% PF flush 3 mL     sodium chloride (PF) 0.9% PF flush 3 mL     sodium chloride (PF) 0.9% PF flush 3 mL       Allergies   No Known Allergies    Physical Exam   Vital Signs: Temp: 97.5  F (36.4  C) Temp src: Oral BP: (!) 133/97 Pulse: 84   Resp: 24 SpO2: 98 % O2 Device: Nasal cannula Oxygen Delivery: 2 LPM  Weight: 259 lbs 14.76 oz  Gen: alert, interactive, NAD  HEENT: atraumatic, EOMI, neck supple, no cervical adenopathy,   CV: RRR, no murmurs, rubs, or gallops  Lungs: diminished throughout, no wheezes  Abd: soft, NT, ND, +BS  Ext: warm and well perfused, no significant LE swelling or edema  Neuro: alert and oriented x4, no focal deficits  Skin: warm and dry, no rashes on exposed surfaces    Data      I personally reviewed: EKG that shows NSR with no acute ST-T changes  Bedside limited TTE that was technically limited given body habitus, but grossly normal LVEF with moderately reduced RV with at least moderate RV dilation and septal flattening consistent with RV pressure overload. Unable to assess IVC    Results for orders placed or performed during the hospital encounter of 03/29/21 (from the past 24 hour(s))   Lactic acid level STAT   Result Value Ref Range    Lactate for Sepsis Protocol 1.1 0.7 - 2.0 mmol/L   Troponin I   Result Value Ref Range    Troponin I ES 0.056 (H) 0.000 - 0.045 ug/L   EKG 12-lead, tracing only   Result Value Ref Range    Interpretation ECG Click View Image link to view waveform and result

## 2021-03-29 NOTE — PROGRESS NOTES
Patient Name: Brian Bowers  Medical Record Number: [id number]  Today's Date: 3/29/2021    Procedure: Mechanical thrombectomy for pulmonary embolism  Proceduralist: Dr. Manish Banks and Dr. Lorraine Green    Procedure Start: 0215  Procedure end: 0425  Sedation medications administered: Fentanyl 200 mcg, Versed 4 mg    Report given to: SCARLETT Welch 6B  : n/a    Other Notes: Pt arrived to IR room #1 from Unit 6B. Consent reviewed. Pt denies any questions or concerns regarding procedure. Pt positioned supine and monitored per protocol. Right femoral vein puncture; woggle stitch with stopcock placed to hold pressure. IR fellow to remove later today. Pt tolerated procedure without any noted complications. Pt transferred back to Unit 6B.

## 2021-03-29 NOTE — PROGRESS NOTES
Admission          3/29/2021 12:02 AM  -----------------------------------------------------------  Reason for admission: pulmonary embolism  Primary team notified of pt arrival.  Admitted from: [facility]  Via: stretcher  Accompanied by: self  Belongings: Placed in closet; valuables sent home with family  Admission Profile: complete  Teaching: orientation to unit and call light- call light within reach, call don't fall, use of console, meal times, when to call for the RN, and enforced importance of safety   Access: PIV  Telemetry: Placed on pt  Ht./Wt.: complete  Code Status verified on armband: yes  2 RN Skin Assessment Completed By: Yuri Reynolds RN & Elisabeth PANTOJA  Med Rec completed: yes  Bed surface reassessed with algorithm and charted: yes  New bed surface ordered: no    Pt status: stable    Temp:  [97.5  F (36.4  C)-98.9  F (37.2  C)] 97.5  F (36.4  C)  Pulse:  [] 84  Resp:  [24-26] 24  BP: (117-174)/() 133/97  SpO2:  [90 %-98 %] 98 %

## 2021-03-29 NOTE — ED NOTES
"Patient returned from CT scan and connected to monitoring devices. Patient reports \"feeling fine\" and denies and increased SOB. Pt was maintaining oxygen saturations at 90% so 2L of oxygen via nasal cannula applied. MD notified. Call light within reach, continuous cardiac monitoring and pulse oximetry continue. ABCs intact.   "

## 2021-03-29 NOTE — PROGRESS NOTES
[facility]  Transfer Triage Note    Date of call: 03/28/21  Time of call: 9:37 PM    Is pandemic COVID-19 a concern? NO    Reason for transfer: Further diagnostic work up, management, and consultation for specialized care   Diagnosis: Large PE    Outside Records: Available  Additional records requested to be faxed to [phone].    Stability of Patient: Patient is vitally stable, with no critical labs, and will likely remain stable throughout the transfer process  ICU: No    Expected Time of Arrival for Transfer: 0-8 hours    Arrival Location:  [address] [address] Phone: [phone]    Recommendations for Management and Stabilization: Not needed    Additional Comments   Brian Bowers is a 71 year old male with history of hypertension, ANCA, psoriasis, GERD, prostate cancer, who presents for evaluation of shortness of breath and syncope to [facility]. He was subsequently found to have large PE with evidence for elevated right heart pressures on CT scan. Troponin elevated to 0.062. He was started on heparin infusion. His hemodynamics are stable and he is not hypoxic. He should transfer to intermediate care for PERT team response. IR team at [facility] when called for a bed by the [facility] provider asked that the patient have a TTE and bilateral LE dopplers ordered on arrival.      Scooter Blackwell MD

## 2021-03-29 NOTE — PROCEDURES
[facility]    Procedure: IR Procedure Note    Date/Time: 3/29/2021 4:52 AM  Performed by: Lorraine Green MD  Authorized by: Lorraine Green MD   IR Fellow Physician:  Radiology Resident Physician: JOSÉ LUIS Green MD.   Other(s) attending procedure: XIOMARA Banks MD.     UNIVERSAL PROTOCOL   Site Marked: Yes  Prior Images Obtained and Reviewed:  Yes  Required items: Required blood products, implants, devices and special equipment available    Patient identity confirmed:  Verbally with patient, arm band, provided demographic data and hospital-assigned identification number  Patient was reevaluated immediately before administering moderate or deep sedation or anesthesia  Confirmation Checklist:  Patient's identity using two indicators, relevant allergies, procedure was appropriate and matched the consent or emergent situation and correct equipment/implants were available  Time out: Immediately prior to the procedure a time out was called    Universal Protocol: the Joint Commission Universal Protocol was followed    Preparation: Patient was prepped and draped in usual sterile fashion           ANESTHESIA    Anesthesia: Local infiltration  Local Anesthetic:  Lidocaine 1% without epinephrine      SEDATION    Patient Sedated: Yes    Sedation Type:  Moderate (conscious) sedation  Vital signs: Vital signs monitored during sedation    See dictated procedure note for full details.  Findings: -Bilateral PEs    Specimens: none    Complications: None    Condition: Stable    Plan: -Resume inpatient care  -Restart high intensity heparin   -Bedrest x 3 hours with right leg straight.   -Woggle suture to remove later today.       PROCEDURE   Patient Tolerance:  Patient tolerated the procedure well with no immediate complications  Describe Procedure: Mechanical thrombectomy of bilateral pulmonary arteries.   Length of time physician/provider present for 1:1 monitoring during sedation: 240

## 2021-03-29 NOTE — PLAN OF CARE
Pt arrived to unit 6B room 37-1 from [facility], ambulated from stretcher to bed with minimal assistance. Pt A&O X4, answers questions/follows commands appropriately. VSS, afebrile, HR sinus rhythm 80-90's, BP's 120-130's/70's, O2 sats > 90% on 4L via Oxyplus mask. LS coarse s/p thrombectomy, BS hypoactive X4, CMS intact. Pt returned to unit at 0500 from IR, transferred from stretcher to bed via Hovermat. R groin site WDL, soft/no hematoma, dressing has small serosanguinous drainage, Woggle suture tightened, RLE CMS intact. PIV in R arm patent & infusing Heparin gtt @ 1800 units/hr, PIV in L arm patent & infusing LR @ 75mL/hr. Tolerating clear liquid diet with no nausea/emesis. Voiding clear mendoza urine via urinal, passing gas, no BM. Pt is on strict bedrest until 0800, then able to be up with assist. Has call light within reach, able to make needs known, will continue to monitor per POC, plan for BLE ultrasound this AM.

## 2021-03-29 NOTE — H&P
[facility]    History and Physical - Hospitalist Service, Gold night       Date of Admission:  3/29/2021    Assessment & Plan   Brian Bowers is a 71 year old male admitted on 3/29/2021. He has hx of Prostate cancer (Dx in 2019, treated w radiation tx), psoriasis, hypothyroidism, ANCA on CPAP, GERD and hypertension who had been experiencing CASTELLANOS lately presented to [facility] ED after a syncopal episode found to have bilateral PE w large clot burden and evidence of R heart strain on CT admitted to [facility] for further management.     Massive bilateral PE with syncope  No prior episode of VTE. In terms of risk factor, he has hx of prostate ca, though this was fully treated per patient. No recent admission or prolonged immobility. No hx of Covid. Does not smoke.  CT PE w large clot burden with evidence for elevated right heart pressure. Troponin 0.062 EKG Sinus w HR 83. Started on heparin gtt.  - continue heparin gtt  - PERT activated on arrival. Discussed w IR and cardiology. Cardiology will perform stat bedside echo. IR is planning to proceed with thrombectomy.  - stat LE doppler ordered, ultrasound aware.  - NPO for the procedure  - repeat troponin, EKG  - titrate O2, currently requiring 2LNC sat low 90s  - likely transition to DOAC prior to discharge. Treatment duration TBD (does have hx of prostate ca, but in remission/localized s/p radiation)    REANNA on CKD  Cre 1.6 on admission, baseline appears to be around 1.3-1.4 (10/2020).  - start LR 75ml/hr as he will be receiving contrast    Hypothyroidism  - continue synthroid    Hypertension  Takes lisinopril   - hold it today, plan to resume on 3/30 as long as Cre stable/improving    ANCA on CPAP  Uses CPAP at home.  - ordered nightly CPAP    GERD  Hx of large hiatal hernia  Takes prilosec  - continue prilosec 20mg daily    Psoriasis  Not on any systemic meds. Patient requested topical cream.  - emollient cream prn ordered     Diet:  NPO per Anesthesia Guidelines for Procedure/Surgery Except for: Meds    DVT Prophylaxis: on heparin gtt  Baker Catheter: not present  Code Status: Full Code           Disposition Plan   Expected discharge: 2 - 3 days, recommended to prior living arrangement once bilateral PE treated and outpatient regimen determined.  Entered: Farrah Tam MD 03/29/2021, 12:59 AM     The patient's care was discussed with the Bedside Nurse, Patient and IR and cardiology Consultant.    Farrah Tam MD  [facility]  Contact information available via [facility] Paging/Directory  Please see sign in/sign out for up to date coverage information    ______________________________________________________________________    Chief Complaint   Shortness of breath and syncope    History is obtained from the patient    History of Present Illness   Brian Boewrs is a 71 year old male who has hx of hypertension, ANCA on CPAP, psoriasis, GERD and prostate cancer. He started to notice gradual onset fatigue and shortness of breath/CASTELLANOS. Earlier on the day of admission, he started to feel more short of breath, was planning to go out to dinner with his girl friend, but as he was walking outside, he felt lightheaded and had a syncopal event that lasted for about 2 minutes. His girlfriend called EMS and he was brought to [facility] ED. He was found to be tachycardic and sat 92% per EMS. He denied acute onset chest pain or SOB, but feels the dyspnea gradually started. Denied recent prolonged imobilization, no smoking, no prior episode of VTE. He never had covid (received vaccinations). No fever, no chills, feels better with supplemental O2.   In the ED, he was diagnosed w PE with CT evidence of increased R side pressure. He was transferred for possible PERT.     Review of Systems    The 10 point Review of Systems is negative other than noted in the HPI or here.     Past Medical History    I have reviewed this  patient's medical history and updated it with pertinent information if needed.   Past Medical History:   Diagnosis Date     GERD (gastroesophageal reflux disease)        Past Surgical History   I have reviewed this patient's surgical history and updated it with pertinent information if needed.  No past surgical history on file.    Social History   I have reviewed this patient's social history and updated it with pertinent information if needed.  Social History     Tobacco Use     Smoking status: Never Smoker   Substance Use Topics     Alcohol use: Yes     Drug use: No       Family History   I have reviewed this patient's family history and updated it with pertinent information if needed.  Family History   Problem Relation Age of Onset     Prostate Cancer Father    NO FHx of VTE    Prior to Admission Medications   Prior to Admission Medications   Prescriptions Last Dose Informant Patient Reported? Taking?   PREVACID 15 MG OR CPDR   No No   Si CAPSULE DAILY BEFORE EATING      ORDER # [id number]   levothyroxine (SYNTHROID/LEVOTHROID) 75 MCG tablet   Yes No   Sig: TAKE ONE TABLET BY MOUTH ONE TIME DAILY   lisinopril (ZESTRIL) 20 MG tablet   Yes No   Sig: Take 20 mg by mouth   omeprazole (PRILOSEC) 20 MG DR capsule   Yes No   Sig: TAKE 1 CAPSULE BY MOUTH ONCE DAILY ONE HOUR BEFORE A MEAL      Facility-Administered Medications: None     Allergies   No Known Allergies    Physical Exam   Vital Signs: Temp: 97.5  F (36.4  C) Temp src: Oral BP: (!) 133/97 Pulse: 84   Resp: 24 SpO2: 98 % O2 Device: Nasal cannula Oxygen Delivery: 2 LPM  Weight: 259 lbs 14.76 oz    General Appearance: alert and oriented, lying flat in bed, obese  Eyes: conj pink, sclerae unicteric  HEENT: MMM  Respiratory: unlabored, clear bilaterally anterioirly  Cardiovascular: RRR, no murmur  GI: obese, soft non tender  Skin: has psoriatic skin lesions in bilateral lower extremities, no edema  Musculoskeletal: no joint swelling  Neurologic: non focal    Psychiatric: stable mood    Data   Data reviewed today: I reviewed all medications, new labs and imaging results over the last 24 hours. I personally reviewed the EKG tracing showing NSR, no acute STT change.    Recent Labs   Lab 03/29/21  0032 03/28/21  1837   WBC  --  10.5   HGB  --  14.4   MCV  --  97   PLT  --  187   NA  --  139   POTASSIUM  --  3.8   CHLORIDE  --  105   CO2  --  25   BUN  --  18   CR  --  1.51*   ANIONGAP  --  9   MAURICIO  --  8.4*   GLC  --  111*   TROPI 0.056* 0.062*     Recent Results (from the past 24 hour(s))   CT Chest Pulmonary Embolism w Contrast   Result Value    Radiologist flags Pulmonary embolism (AA)    Narrative    EXAM: CT CHEST PULMONARY EMBOLISM W CONTRAST  LOCATION: [facility]  DATE/TIME: 3/28/2021 7:28 PM    INDICATION: SOB which started last night. Patient states he also had a syncopal episode last night as well. Patient state he is still having SOB today  COMPARISON: None.  TECHNIQUE: CT chest pulmonary angiogram during arterial phase injection of IV contrast. Multiplanar reformats and MIP reconstructions were performed. Dose reduction techniques were used.   CONTRAST: 80mL Isovue-370    FINDINGS:  ANGIOGRAM CHEST: Acute pulmonary embolism is present, large clot burden beginning centrally bilaterally and extending into all lobar branches, right slightly greater than left. Pulmonary arteries mildly dilated. Thoracic aorta normal in caliber. No   aortic dissection or other acute abnormality.    HEART: There is mild right ventricular enlargement and straightening of the interventricular septum. No pericardial effusion. No coronary artery calcification.    LUNGS AND PLEURA: Partially obscured by respiratory motion. No pulmonary mass or consolidation. No definite suspicious pulmonary nodule. No pleural effusion or pneumothorax.    MEDIASTINUM: Large hiatal hernia. No adenopathy.    LIMITED UPPER ABDOMEN: Hepatic steatosis.    MUSCULOSKELETAL: Negative.       Impression    IMPRESSION:  1.  Pulmonary embolism is present, large clot burden with evidence for elevated right heart pressure.  2.  Large hiatal hernia.      [Critical Result: Pulmonary embolism]    Finding was identified on 3/28/2021 7:43 PM.     Dr. Ponce was contacted by me on 3/28/2021 7:55 PM and verbalized understanding of the critical result.

## 2021-03-29 NOTE — PROGRESS NOTES
[facility]    Medicine Progress Note - Hospitalist Service, Gold 4       Date of Admission:  3/29/2021  Assessment & Plan    Brian Bowers is a 71 year old male with a hx of prostate cancer (dx in 2019, treated w/ radiation tx), psoriasis, hypothyroidism, ANCA on CPAP, GERD, and hypertension who presented to ED following syncopal episode and CASTELLANOS and found to have bilateral PE w/ large clot burden with evidence of R heart strain on CT.     # Massive bilateral PE w/ syncope.  # Hematemesis, likely related to swallowed hemoptysis   CASTELLANOS prior to admission. CT PE w/ large clot burden with evidence of elevated R heart pressure. US Lower extremity with occlusive and nonocclusive thrombus throughout the L femoral vein. Underwent emergent thrombectomy by IR then placed on Heparin gtt. Drip held shortly morning of 3/29 due to vomiting blood, per STAT GI consult unlikely acute GI bleed thus gtt resumed. Code blue called due to subsequent syncopal episode 3/29 while on the commode. Tele showed sinus bradycardia with HR in 50-60s.Troponin elevated 0.62 and BNP 1263. Lactate checked at the time 3.9-->2.5 after 500 LR fluid bolus.   - Continue Heparin gtt  - q6h CBC w/ LFTs  - Trend lactate until normalized  - Trend troponin to peak  - If pt becomes hypotensive (<90/60) page on call cardiology fellow to assess for RV failure/development of cardiogenic shock  WBC 15.4 without localizing s/s of infection, likely stress demargination but low threshold to start antibiotics   - Can cautiously advance diet, start with clear liquids this afternoon, advance to full liquid later this evening/tomorrow morning  - GI following  - Cardiology following    REANNA on CKD. Bl creatinine around 1.3-1.4. 1.6 on admission. Started on gentle IVF with improvement to 1.49.     Hypothyroidism. Continue PTA synthroid.    Hypertension. PTA managed on lisinopril, held on admission.     ANCA on CPAP. CPAP at night.      GERD  Hx of large hiatal hernia  - PPI IV due to concern for GI bleed, can likely switch to oral tomorrow    Psoriasis. Continue emollient crm prn       Diet: Advance Diet as Tolerated: Clear Liquid Diet    DVT Prophylaxis: Heparin gtt  Baker Catheter: not present  Code Status: Full Code           Disposition Plan   Expected discharge: 2 - 3 days, recommended to prior living arrangement once PE treated and oupatient AC plan determined.  Entered: Mabel Cervantes PA-C 03/29/2021, 10:10 AM       The patient's care was discussed with the Attending Physician, Dr. Charlton.    Mabel Cervantes PA-C  Hospitalist Service, [address]  Contact information available via [facility] Paging/Directory  Please see sign in/sign out for up to date coverage information  ______________________________________________________________________    Interval History   Admitted for massive bilateral PE. Started on heparin gtt and had thrombectomy with IR which he tolerated well. Following thrombectomy pt vomited blood. Heparin gtt subseqeuntly held and STAT IR luminal consult placed who did not think this was an acute GI bleed. Pt then experienced syncopal episode while on the commode, likely vasovagal, with 6-10 sec of unresponsiveness. No further hematemesis. No increased O2 needs. Pt notes that he felt lightheaded while on the commode. Alert and oriented following incident. Denies any ongoing lightheadedness or dizziness. No increased work of breathing, cough, chest pain, body aches or chills.     Data reviewed today: I reviewed all medications, new labs and imaging results over the last 24 hours.    Physical Exam   Vital Signs: Temp: 97.8  F (36.6  C) Temp src: Oral BP: (!) 140/88 Pulse: 116   Resp: 18 SpO2: (!) 88 % O2 Device: Nasal cannula Oxygen Delivery: 6 LPM  Weight: 259 lbs 14.76 oz  General Appearance: Alert and oriented x 3  Respiratory: normal work of breathing on 6L  oximask, lungs CTAB  Cardiovascular: RRR, no murmur auscultated  GI: abdomen rotund, non tender to palpation  Skin: psoriatic lesions on bilateral LE  Other: No peripheral edema     Data   Recent Labs   Lab 03/29/21  0940 03/29/21  0900 03/29/21  0131 03/29/21  0032 03/28/21  1837   WBC 15.4* 13.1* 9.1  --  10.5   HGB 12.5* 11.6* 13.3  --  14.4   * 98 97  --  97    172 157  --  187   INR 1.30* 1.34*  --   --   --     139 138  --  139   POTASSIUM 4.0 3.2* 3.8  --  3.8   CHLORIDE 108 106 107  --  105   CO2 PENDING 25 22  --  25   BUN PENDING 11 19  --  18   CR PENDING 0.77 1.26*  --  1.51*   ANIONGAP PENDING 7 9  --  9   MAURICIO PENDING 8.3* 8.8  --  8.4*   GLC PENDING 107* 116*  --  111*   TROPI  --   --   --  0.056* 0.062*

## 2021-03-29 NOTE — CONSULTS
GASTROENTEROLOGY CONSULTATION      Date of Admission:  3/29/2021           Reason for Consultation:   We were asked by Dr. Tam to evaluate this patient with possible hematemesis           ASSESSMENT AND RECOMMENDATIONS:   Assessment:  71 year old male with a history of prostate cancer, ANCA on CPAP, GERD, who was admitted to the hospital with submassive pulmonary emboli, placed on high intensity heparin, underwent mechanical thrombectomy, and this morning had an episode of hematemesis in radiology.  GI is consulted for consideration of upper endoscopy.    # Hematemesis, likely related to swallowed hemoptysis  # Hemoptysis  # Submassive pulmonary embolism, on high intensity heparin  # Large hiatal hernia  Patient has been having multiple episodes of hemoptysis since placement on anticoagulation and thrombectomy overnight, and has been swallowing some of this. Episode of emesis by report had some brighter red and more maroon components. Overall, most likely etiology of his hematemesis is that of swallowed blood from his hemoptysis. However, given his recent ibuprofen use, UGI source is not out of the question, but felt less likely. Dwain lesions also on the differential given hiatal hernia. Varices are felt to be unlikely given no history of or stigmata of liver disease. Given his overall need for anticoagulation, submassive pulmonary emboli, and tenuous clinical status, EGD is not recommended at this point of time. We can treat medically with PPI.  We will continue to monitor for further episodes of hematemesis, and ask to have his hemoptysis suctioned rather than swallowed. If hematemesis recurs we will consider EGD at that time.    Recommendations:  - Continue supportive cares (two large bore IVs, telemetry)   - IV PPI BID   - Suction hemoptysis rather than swallow  - OK to place patient on anticoagulation for submassive PE from GI standpoint  - No EGD at this time  - Continue to monitor for further episodes  of hematemesis - if recurs, keep patient NPO at midnight for possible EGD tomorrow  - If patient becomes hemodynamically unstable related to GI bleeding, please call GI fellow on call    Gastroenterology outpatient follow up recommendations: TBD    Thank you for involving us in this patient's care. Please do not hesitate to contact the GI service with any questions or concerns.     Pt care plan discussed with Dr. Wang, GI staff physician.    Manish Bacon MD    Attending Attestation:  I saw the patient with Dr. Bacon and agree with the findings and the plan of care as documented in the fellow's note. In summary, the patient is an 71 year old male with a history of prostate cancer, ANCA on CPAP, GERD, who was admitted to the hospital with submassive pulmonary emboli, placed on high intensity heparin, underwent mechanical thrombectomy, and this morning had an episode of hematemesis in radiology.. We have been consulted to assess the patient's hematemesis.    The patient was seen in his room on the medical floor with respect to his hematemesis. The patient states that he has been coughing up blood and swallowing some of it. He hemoglobin is stable despite these events. Also normal BUN noted. At this time we feel that the episode of hematemesis was from swallowed hemoptysis and does not represent bleeding from within the GI tract. That being said, he is at risk of a GI bleed and should remain on a BID PPI. If there is evidence of a GI bleed moving forward upper endoscopy will be considered. This will be extremely high risk in the setting of his PE, right heart strain, and elevated troponin. Anticoagulate as needed for PE.     Overall time spent discussing, thinking, reviewing the chart including available imaging and labs, and evaluating the patient was 100 minutes.    Jerman Wang DO   of Medicine  Division of Gastroenterology, Hepatology, and Nutrition  [facility]  [place]      -------------------------------------------------------------------------------------------------------------------           History of Present Illness:   Brian Bowers is a 71 year old male with a history of prostate cancer, ANCA on CPAP, GERD, who was admitted to the hospital with submassive pulmonary emboli, placed on high intensity heparin, underwent mechanical thrombectomy, and this morning had an episode of hematemesis in radiology.  GI is consulted for consideration of upper endoscopy.    Apparently, he was having dyspnea on exertion prior to arrival, and ultimately had a syncopal episode prompting presentation to the ED.  He underwent CT PE protocol and was found to have large pulmonary embolism burden in the right/left pulmonary vasculature, along with evidence of right heart strain and elevated troponins, consistent with submassive pulmonary embolism.  He was started on a heparin drip, and transferred here, where he underwent thrombectomy overnight with IR at which time additional 14,000 units of heparin were given. This morning, he had an episode of bright red and maroon emesis. He had been having ongoing hemoptysis prior, which he had been swallowing given lack of access to suction.     Labs this morning show hemoglobin of 12.5, WBC of 15.4, platelets of 180.  INR was 1.3.  Troponin 0 0.620, lactic acid 3.9.  BUN 25, creatinine 1.49.    Patient does take rare NSAIDs (most recently 2 days ago took for ibuprofen, prior to that had been weeks). No history of GI bleed or PUD. No liver disease per report or on imaging. No alcohol use.             Past Medical History:   Reviewed and edited as appropriate  Past Medical History:   Diagnosis Date     GERD (gastroesophageal reflux disease)             Past Surgical History:   Reviewed and edited as appropriate   Past Surgical History:   Procedure Laterality Date     IR PULMONARY ANGIOGRAM BILATERAL  3/29/2021            Previous Endoscopy:   No  results found for this or any previous visit.         Social History:   Reviewed and edited as appropriate  Social History     Socioeconomic History     Marital status:      Spouse name: Not on file     Number of children: Not on file     Years of education: Not on file     Highest education level: Not on file   Occupational History     Not on file   Social Needs     Financial resource strain: Not on file     Food insecurity     Worry: Not on file     Inability: Not on file     Transportation needs     Medical: Not on file     Non-medical: Not on file   Tobacco Use     Smoking status: Never Smoker   Substance and Sexual Activity     Alcohol use: Yes     Drug use: No     Sexual activity: Yes     Partners: Female   Lifestyle     Physical activity     Days per week: Not on file     Minutes per session: Not on file     Stress: Not on file   Relationships     Social connections     Talks on phone: Not on file     Gets together: Not on file     Attends Islam service: Not on file     Active member of club or organization: Not on file     Attends meetings of clubs or organizations: Not on file     Relationship status: Not on file     Intimate partner violence     Fear of current or ex partner: Not on file     Emotionally abused: Not on file     Physically abused: Not on file     Forced sexual activity: Not on file   Other Topics Concern     Parent/sibling w/ CABG, MI or angioplasty before 65F 55M? Not Asked   Social History Narrative     Not on file            Family History:   Reviewed and edited as appropriate  Family History   Problem Relation Age of Onset     Prostate Cancer Father      No known history of colorectal cancer, liver disease, or inflammatory bowel disease.         Allergies:   Reviewed and edited as appropriate   No Known Allergies         Medications:     Current Facility-Administered Medications   Medication     acetaminophen (TYLENOL) tablet 650 mg     Anticoagulant order placed during this  "visit     emollient (VANICREAM) cream     heparin 25,000 units in 0.45% NaCl 250 mL ANTICOAGULANT infusion     levothyroxine (SYNTHROID/LEVOTHROID) tablet 75 mcg     lidocaine (LMX4) cream     lidocaine 1 % 0.1-1 mL     [START ON 3/30/2021] lisinopril (ZESTRIL) tablet 20 mg     melatonin tablet 1 mg     naloxone (NARCAN) injection 0.2 mg    Or     naloxone (NARCAN) injection 0.4 mg    Or     naloxone (NARCAN) injection 0.2 mg    Or     naloxone (NARCAN) injection 0.4 mg     [Held by provider] omeprazole (priLOSEC) CR capsule 20 mg     ondansetron (ZOFRAN-ODT) ODT tab 4 mg    Or     ondansetron (ZOFRAN) injection 4 mg     oxyCODONE (ROXICODONE) tablet 5-10 mg     pantoprazole (PROTONIX) IV push injection 40 mg     Patient is already receiving anticoagulation with heparin, enoxaparin (LOVENOX), warfarin (COUMADIN)  or other anticoagulant medication     polyethylene glycol (MIRALAX) Packet 17 g     sodium chloride (PF) 0.9% PF flush 3 mL     sodium chloride (PF) 0.9% PF flush 3 mL     sodium chloride (PF) 0.9% PF flush 3 mL             Review of Systems:     A complete 10 point review of systems was performed and is negative except as noted in the HPI           Physical Exam:   BP (!) 151/94 (BP Location: Left arm)   Pulse 112   Temp 97.6  F (36.4  C) (Oral)   Resp 22   Ht 1.753 m (5' 9\")   Wt 117.9 kg (259 lb 14.8 oz)   SpO2 100%   BMI 38.38 kg/m    Wt:   Wt Readings from Last 2 Encounters:   03/29/21 117.9 kg (259 lb 14.8 oz)   03/28/21 116.6 kg (257 lb)      Constitutional: No acute distress, resting comfortably in bed, suction canister noted with blood in it from oral cavity and from what patient describes is coughed up blood  Eyes: Sclera anicteric  Ears/nose/mouth/throat: Moist mucus membranes, hearing intact. Dried blood around gown but no blood in the oropharynx on my exam.  Neck: supple  CV: No edema  Respiratory: Breathing comfortably on room air  Abd: Soft, nontender, nondistended, bowel sounds " present.   Skin: warm, perfused, no jaundice. No stigmata of liver disease.   Neuro: AAO x 3  Psych: Normal affect  MSK: No gross deformities         Data:   Labs and imaging below were independently reviewed and interpreted    BMP  Recent Labs   Lab 03/29/21  0940 03/29/21  0900 03/29/21  0131 03/28/21  1837    139 138 139   POTASSIUM 4.0 3.2* 3.8 3.8   CHLORIDE 108 106 107 105   MAURICIO 8.1* 8.3* 8.8 8.4*   CO2 20 25 22 25   BUN 25 11 19 18   CR 1.49* 0.77 1.26* 1.51*   * 107* 116* 111*     CBC  Recent Labs   Lab 03/29/21  0940 03/29/21  0900 03/29/21  0131 03/28/21  1837   WBC 15.4* 13.1* 9.1 10.5   RBC 3.93* 3.67* 4.23* 4.47   HGB 12.5* 11.6* 13.3 14.4   HCT 39.8* 35.9* 41.0 43.4   * 98 97 97   MCH 31.8 31.6 31.4 32.2   MCHC 31.4* 32.3 32.4 33.2   RDW 12.9 13.0 12.9 12.7    172 157 187     INR  Recent Labs   Lab 03/29/21  0940 03/29/21  0900   INR 1.30* 1.34*     LFTs  Recent Labs   Lab 03/29/21  1101   ALKPHOS 49   AST 25   ALT 27   BILITOTAL 0.5   PROTTOTAL 6.5*   ALBUMIN 2.7*      PANCNo lab results found in last 7 days.    Imaging:   IMPRESSION:  1.  Pulmonary embolism is present, large clot burden with evidence for elevated right heart pressure.  2.  Large hiatal hernia.

## 2021-03-29 NOTE — PROGRESS NOTES
Cross cover    Paged by RN that he vomitted blood (blue bag full) while in ultrasound this morning. He underwent PERT and was placed back on high intensity heparin gtt. Has been NPO, bed rest for R groin site that has oozing. VSS    - stat type and screen  - Hb q6h next 10am  - has 2 large bore IV  - changed PPI to IV BID  - held heparin gtt for now  - urgent GI luminal consult requested    Patient care signed out to Branden Charlton MD and KELI Rodriguez

## 2021-03-29 NOTE — ED NOTES
3/28/2021, 10:15 PM    Destination Facility:   __[facility]______________________________________    Name:  Brian Bowers  Age: 71 year old  : 1950  Gender: male  Weight:   Wt Readings from Last 2 Encounters:   21 116.6 kg (257 lb)   13 108 kg (238 lb)       Diagnosis:      ICD-10-CM    1. Acute pulmonary embolism with acute cor pulmonale, unspecified pulmonary embolism type (H)  I26.09 Partial thromboplastin time       Allergies:  No Known Allergies    Vitals:  Patient Vitals for the past 2 hrs:   BP Pulse SpO2   21 2145 (!) 132/97 91 91 %   210 (!) 129/97 93 91 %   21 (!) 149/91 93 91 %   21 -- 92 91 %   21 (!) 165/108 94 93 %   21 (!) 166/114 92 94 %        Medications Given:  Medications   heparin 25,000 units in 0.45% NaCl 250 mL ANTICOAGULANT infusion (1,800 Units/hr Intravenous New Bag 3/28/21 2017)   aspirin (ASA) chewable tablet 324 mg (324 mg Oral Given 3/28/21 1902)   iopamidol (ISOVUE-370) solution 500 mL (80 mLs Intravenous Given 3/28/21 1929)   for CT scan flush use (90 mLs Intravenous Given 3/28/21 1929)   heparin ANTICOAGULANT loading dose for  HIGH INTENSITY TREATMENT* Give BEFORE starting heparin infusion (8,000 Units Intravenous Given 3/28/21 2016)       O2 / VENT Settings:     1.  NC @ _2_ lpm      IV/Drains:     1.  PIV:  18g L AC          Time report called to receiving facility: , SCARLETT Welch      INSTRUCTIONS TO EMS:

## 2021-03-29 NOTE — PLAN OF CARE
Brief Cardiology Follow Up note:    Mr Bowers is a 71 year old male with history of HTN, ANCA, BMI of 38, prostate CA, and GERD who presented to an outside ED with 10+ days of progressive dyspnea and fatigue with an episode of syncope.     On arrival, patient HD stable, tachycardic to low 100s and sating 92% on RA. CTA chest revealed an acute central massive PE with bilateral extension into all lobar branches. Stat TTE revealed RV moderate RV dilation with akinetic RV free wall.  Troponin elevated to 0.06 >> 0.62 this AM; NT pro-BNP 1263. The patient underwent an emergent mechanical thrombectomy by IR; initiated on anti-coagulation w/ heparin gtt as well.     This AM, the patient had an episode of syncope while sitting on the commode following which a code blue was called due to concern for pulselessness (false alarm). He reportedly developed sinus bradycardia at the time w/ HR in 50-60', otherwise remained HD stable (/70's). Given 500 ml LR bolus at the time. Lactate checked at the time 3.9 >> 2.5 on recheck. LFT's normal.     Shortly thereafter, the patient had an episode of vomiting intermixed with blood (unclear if hematemesis). Patient being evaluated by the GI team for the same.     From a cardiac perspective, plan as outlined below:   - q6 CBC w/ LFT checks.  - Please trend lactate until normalization.   - Trend troponin to peak.  - Please avoid maintenance IV fluids, pt already showing s/s of mild pulmonary congestion.  - If the patient becomes hypotensive (<90/60 mm Hg), please page the cardiology fellow on call to assess for RV failure/development of cardiogenic shock.       Soraya Kaiser MD,   Cardiovascular Disease Fellow  Pager [phone]

## 2021-03-30 LAB
ANION GAP SERPL CALCULATED.3IONS-SCNC: 9 MMOL/L (ref 3–14)
BUN SERPL-MCNC: 24 MG/DL (ref 7–30)
CALCIUM SERPL-MCNC: 8.4 MG/DL (ref 8.5–10.1)
CHLORIDE SERPL-SCNC: 106 MMOL/L (ref 94–109)
CO2 SERPL-SCNC: 21 MMOL/L (ref 20–32)
CREAT SERPL-MCNC: 1.3 MG/DL (ref 0.66–1.25)
ERYTHROCYTE [DISTWIDTH] IN BLOOD BY AUTOMATED COUNT: 12.8 % (ref 10–15)
GFR SERPL CREATININE-BSD FRML MDRD: 55 ML/MIN/{1.73_M2}
GLUCOSE BLDC GLUCOMTR-MCNC: 120 MG/DL (ref 70–99)
GLUCOSE SERPL-MCNC: 131 MG/DL (ref 70–99)
HCT VFR BLD AUTO: 32 % (ref 40–53)
HGB BLD-MCNC: 10.3 G/DL (ref 13.3–17.7)
HGB BLD-MCNC: 9.5 G/DL (ref 13.3–17.7)
MCH RBC QN AUTO: 32.1 PG (ref 26.5–33)
MCHC RBC AUTO-ENTMCNC: 32.2 G/DL (ref 31.5–36.5)
MCV RBC AUTO: 100 FL (ref 78–100)
PLATELET # BLD AUTO: 138 10E9/L (ref 150–450)
POTASSIUM SERPL-SCNC: 4.1 MMOL/L (ref 3.4–5.3)
RBC # BLD AUTO: 3.21 10E12/L (ref 4.4–5.9)
SODIUM SERPL-SCNC: 136 MMOL/L (ref 133–144)
T4 FREE SERPL-MCNC: 1.03 NG/DL (ref 0.76–1.46)
TROPONIN I SERPL-MCNC: 0.06 UG/L (ref 0–0.04)
TROPONIN I SERPL-MCNC: 0.09 UG/L (ref 0–0.04)
TSH SERPL DL<=0.005 MIU/L-ACNC: 6.46 MU/L (ref 0.4–4)
UFH PPP CHRO-ACNC: 0.48 IU/ML
WBC # BLD AUTO: 10.5 10E9/L (ref 4–11)

## 2021-03-30 PROCEDURE — C9113 INJ PANTOPRAZOLE SODIUM, VIA: HCPCS | Performed by: INTERNAL MEDICINE

## 2021-03-30 PROCEDURE — 36415 COLL VENOUS BLD VENIPUNCTURE: CPT | Performed by: INTERNAL MEDICINE

## 2021-03-30 PROCEDURE — 999N001017 HC STATISTIC GLUCOSE BY METER IP

## 2021-03-30 PROCEDURE — 84484 ASSAY OF TROPONIN QUANT: CPT | Performed by: PHYSICIAN ASSISTANT

## 2021-03-30 PROCEDURE — 250N000011 HC RX IP 250 OP 636: Performed by: INTERNAL MEDICINE

## 2021-03-30 PROCEDURE — 250N000013 HC RX MED GY IP 250 OP 250 PS 637: Performed by: PHYSICIAN ASSISTANT

## 2021-03-30 PROCEDURE — 99233 SBSQ HOSP IP/OBS HIGH 50: CPT | Mod: GC | Performed by: STUDENT IN AN ORGANIZED HEALTH CARE EDUCATION/TRAINING PROGRAM

## 2021-03-30 PROCEDURE — 250N000013 HC RX MED GY IP 250 OP 250 PS 637: Performed by: STUDENT IN AN ORGANIZED HEALTH CARE EDUCATION/TRAINING PROGRAM

## 2021-03-30 PROCEDURE — 85018 HEMOGLOBIN: CPT | Performed by: PHYSICIAN ASSISTANT

## 2021-03-30 PROCEDURE — 99207 PR APP CREDIT; MD BILLING SHARED VISIT: CPT | Performed by: PHYSICIAN ASSISTANT

## 2021-03-30 PROCEDURE — 84484 ASSAY OF TROPONIN QUANT: CPT | Performed by: INTERNAL MEDICINE

## 2021-03-30 PROCEDURE — 84439 ASSAY OF FREE THYROXINE: CPT | Performed by: PHYSICIAN ASSISTANT

## 2021-03-30 PROCEDURE — 99232 SBSQ HOSP IP/OBS MODERATE 35: CPT | Performed by: STUDENT IN AN ORGANIZED HEALTH CARE EDUCATION/TRAINING PROGRAM

## 2021-03-30 PROCEDURE — 84443 ASSAY THYROID STIM HORMONE: CPT | Performed by: PHYSICIAN ASSISTANT

## 2021-03-30 PROCEDURE — 999N000127 HC STATISTIC PERIPHERAL IV START W US GUIDANCE

## 2021-03-30 PROCEDURE — 250N000013 HC RX MED GY IP 250 OP 250 PS 637: Performed by: NURSE PRACTITIONER

## 2021-03-30 PROCEDURE — 80048 BASIC METABOLIC PNL TOTAL CA: CPT | Performed by: INTERNAL MEDICINE

## 2021-03-30 PROCEDURE — 120N000003 HC R&B IMCU UMMC

## 2021-03-30 PROCEDURE — 99207 PR CDG-MDM COMPONENT: MEETS LOW - DOWN CODED: CPT | Performed by: STUDENT IN AN ORGANIZED HEALTH CARE EDUCATION/TRAINING PROGRAM

## 2021-03-30 PROCEDURE — 258N000003 HC RX IP 258 OP 636: Performed by: PHYSICIAN ASSISTANT

## 2021-03-30 PROCEDURE — 36415 COLL VENOUS BLD VENIPUNCTURE: CPT | Performed by: PHYSICIAN ASSISTANT

## 2021-03-30 PROCEDURE — 250N000013 HC RX MED GY IP 250 OP 250 PS 637: Performed by: INTERNAL MEDICINE

## 2021-03-30 PROCEDURE — 85027 COMPLETE CBC AUTOMATED: CPT | Performed by: INTERNAL MEDICINE

## 2021-03-30 RX ORDER — PANTOPRAZOLE SODIUM 40 MG/1
40 TABLET, DELAYED RELEASE ORAL
Status: DISCONTINUED | OUTPATIENT
Start: 2021-03-30 | End: 2021-03-31 | Stop reason: HOSPADM

## 2021-03-30 RX ORDER — LANOLIN ALCOHOL/MO/W.PET/CERES
6 CREAM (GRAM) TOPICAL
Status: DISCONTINUED | OUTPATIENT
Start: 2021-03-30 | End: 2021-03-31 | Stop reason: HOSPADM

## 2021-03-30 RX ADMIN — DOCUSATE SODIUM 50 MG AND SENNOSIDES 8.6 MG 1 TABLET: 8.6; 5 TABLET, FILM COATED ORAL at 22:02

## 2021-03-30 RX ADMIN — PANTOPRAZOLE SODIUM 40 MG: 40 INJECTION, POWDER, FOR SOLUTION INTRAVENOUS at 08:55

## 2021-03-30 RX ADMIN — LISINOPRIL 20 MG: 10 TABLET ORAL at 08:55

## 2021-03-30 RX ADMIN — SODIUM CHLORIDE, POTASSIUM CHLORIDE, SODIUM LACTATE AND CALCIUM CHLORIDE 1000 ML: 600; 310; 30; 20 INJECTION, SOLUTION INTRAVENOUS at 16:43

## 2021-03-30 RX ADMIN — APIXABAN 10 MG: 5 TABLET, FILM COATED ORAL at 19:05

## 2021-03-30 RX ADMIN — HEPARIN SODIUM 1500 UNITS/HR: 10000 INJECTION, SOLUTION INTRAVENOUS at 10:58

## 2021-03-30 RX ADMIN — PANTOPRAZOLE SODIUM 40 MG: 40 TABLET, DELAYED RELEASE ORAL at 16:37

## 2021-03-30 RX ADMIN — LEVOTHYROXINE SODIUM 75 MCG: 75 TABLET ORAL at 08:55

## 2021-03-30 RX ADMIN — MELATONIN TAB 3 MG 6 MG: 3 TAB at 22:21

## 2021-03-30 ASSESSMENT — ACTIVITIES OF DAILY LIVING (ADL)
ADLS_ACUITY_SCORE: 19
ADLS_ACUITY_SCORE: 20
ADLS_ACUITY_SCORE: 19

## 2021-03-30 NOTE — PROGRESS NOTES
Gastroenterology Inpatient Sign Off Note    Assessment:  71 year old male with a history of prostate cancer, ANCA on CPAP, GERD, who was admitted to the hospital with submassive pulmonary emboli, placed on high intensity heparin, underwent mechanical thrombectomy, and this morning had an episode of hematemesis in radiology.  GI is consulted for consideration of upper endoscopy.    Overall, source of hematemesis is felt to be swallowed blood. He is high risk for endoscopy and the yield is likely quite low. He should continue on medical therapy for now, and once he is felt to be recovered from his pulmonary emboli/cardiac strain, he can be referred to GI for consideration of endoscopy to r/o upper GI pathology as possible source of hematemesis.    Recommendations:  - Continue oral PPI x2 months, then can discontinue unless further required for treatment of GERD/dyspepsia  - OK to continue anticoagulation as needed for PE from our standpoint  - No indications for EGD at this time  - Once he has stabilized from cardiopulmonary standpoint, can be referred to GI as an outpatient for elective EGD to r/o upper GI pathology. This has not been ordered by our team given unclear clinical course, and should be ordered by PCP at their discretion.  - If he has further episodes of hematemesis, please contact GI team for further recommendations.    Inpatient GI consults service will sign off. No further recommendations at this time. If primary team has addition questions, please page consult fellow listed in David.    Current GI Consult Staff: Felipe     Follow up recommendations:   No outpatient GI clinic follow-up indicated at this time given unclear cardiopulmonary course. Follow-up with primary care provider at timing determined by discharge physician.    When outpatient GI follow-up is felt indicated by primary care, they may coordinate this by placing new GI referral and contacting  General GI clinic - ([phone])

## 2021-03-30 NOTE — PROGRESS NOTES
[facility]    Medicine Progress Note - Hospitalist Service, Gold 4       Date of Admission:  3/29/2021  Assessment & Plan    Brian Bowers is a 71 year old male with a hx of prostate cancer (dx in 2019, treated w/ radiation tx), psoriasis, hypothyroidism, ANCA on CPAP, GERD, and hypertension who presented to ED following syncopal episode and CASTELLANOS and found to have bilateral PE w/ large clot burden with evidence of R heart strain on CT.     # Massive bilateral PE w/ syncope.  # Hematemesis, likely related to swallowed hemoptysis   CASTELLANOS prior to admission. CT PE w/ large clot burden with evidence of elevated R heart pressure. US Lower extremity with occlusive and nonocclusive thrombus throughout the L femoral vein. Underwent emergent thrombectomy by IR then placed on Heparin gtt. Drip held shortly morning of 3/29 due to vomiting blood, per STAT GI consult unlikely acute GI bleed thus gtt resumed. Code blue called due to subsequent syncopal episode 3/29 while on the commode. Tele showed sinus bradycardia with HR in 50-60s.Troponin peaked 0.62 and BNP 1263. Lactate checked at the time 3.9-->2.5 after 500 LR fluid bolus, trended to normal. Hgb trended and stable.   Had long conversation regarding long term AC plan, specifically cost. At this time pt is electing to start coumadin.  - Start Eliquis 10 mg BID x 7 days followed by 5 mg BID   - Discontinue Heparin gtt  - If pt becomes hypotensive (<90/60) page on call cardiology fellow to assess for RV failure/development of cardiogenic shock  - Regular diet  - GI following  - Cardiology following  - Will need outpatient follow up with hematology in 3 months to determine length of ac  - Will need follow up with PCP for repeat TTE to reassess RV function in 4 weeks    REANNA on CKD. Bl creatinine around 1.3-1.4. 1.6 on admission. Again at baseline.    Hypothyroidism. Continue PTA synthroid.    Hypertension. PTA managed on lisinopril,  held on admission.     ANCA on CPAP. CPAP at night.     GERD  Hx of large hiatal hernia  - PPI IV due to concern for GI bleed, will switch to oral.     Psoriasis. Continue emollient crm prn       Diet: Advance Diet as Tolerated: Regular Diet Adult    DVT Prophylaxis: Heparin gtt, coumadin  Baker Catheter: not present  Code Status: Full Code           Disposition Plan   Expected discharge: 1-3, recommended to prior living arrangement once PE treated and oupatient AC plan determined.  Entered: Mabel Cervantes PA-C 03/30/2021, 2:55 PM       The patient's care was discussed with the Attending Physician, Dr. Hakeem Cervantes PA-C  Hospitalist Service, [address]  Contact information available via [facility] Paging/Directory  Please see sign in/sign out for up to date coverage information  ______________________________________________________________________    Interval History   No acute events overnight. Lactate normalized. Hemoglobin remained stable. Troponin peaked and subsequently trended down. Continues to require small amount of supplemental oxygen. No chest pain, shortness of breath or difficulty breathing. No further episode of hemoptysis.     Data reviewed today: I reviewed all medications, new labs and imaging results over the last 24 hours.    Physical Exam   Vital Signs: Temp: 97.6  F (36.4  C) Temp src: Oral BP: 129/69 Pulse: 105   Resp: 18 SpO2: 96 % O2 Device: Nasal cannula Oxygen Delivery: 2 LPM  Weight: 259 lbs 14.76 oz  General Appearance: Alert and oriented x 3  Respiratory: normal work of breathing on room air, lungs CTAB  Cardiovascular: RRR, no murmur auscultated  GI: abdomen rotund, non tender to palpation  Skin: psoriatic lesions on bilateral LE  Other: No peripheral edema     Data   Recent Labs   Lab 03/30/21  0959 03/30/21  0409 03/29/21  2142 03/29/21  1101 03/29/21  1101 03/29/21  0940 03/29/21  0900   WBC  --  10.5  --   --    --  15.4* 13.1*   HGB 9.5* 10.3* 10.2*   < >  --  12.5* 11.6*   MCV  --  100  --   --   --  101* 98   PLT  --  138*  --   --   --  180 172   INR  --   --   --   --   --  1.30* 1.34*   NA  --  136  --   --   --  137 139   POTASSIUM  --  4.1  --   --   --  4.0 3.2*   CHLORIDE  --  106  --   --   --  108 106   CO2  --  21  --   --   --  20 25   BUN  --  24  --   --   --  25 11   CR  --  1.30*  --   --   --  1.49* 0.77   ANIONGAP  --  9  --   --   --  9 7   MAURICIO  --  8.4*  --   --   --  8.1* 8.3*   GLC  --  131*  --   --   --  193* 107*   ALBUMIN  --   --   --   --  2.7*  --   --    PROTTOTAL  --   --   --   --  6.5*  --   --    BILITOTAL  --   --   --   --  0.5  --   --    ALKPHOS  --   --   --   --  49  --   --    ALT  --   --   --   --  27  --   --    AST  --   --   --   --  25  --   --    TROPI 0.057* 0.088* 0.125*   < >  --  0.620*  --     < > = values in this interval not displayed.

## 2021-03-30 NOTE — PLAN OF CARE
"Neuro: A&Ox4.   Cardiac: SR /69 (BP Location: Right arm)   Pulse 105   Temp 97.6  F (36.4  C) (Oral)   Resp 18   Ht 1.753 m (5' 9\")   Wt 117.9 kg (259 lb 14.8 oz)   SpO2 96%   BMI 38.38 kg/m   .   Respiratory: Sating  on RA, needing 2L when sleeping has a CPAP at home   GI/: Due to void, encouraging oral intake. No BM overshift  Diet/appetite: Tolerating Reg diet. Small appetite   Activity:  Assist of x1 up to chair   Pain: At acceptable level on current regimen.   Skin: No new deficits noted.  LDA's: PIV with Heparin 15ml/hr       "

## 2021-03-30 NOTE — PHARMACY-RX INSURANCE COVERAGE
Discharge Pharmacy Test Claim    Ran test script for eliquis and xarelto through pt's [employer] Medicare Part D. Pt has an unmet deductible of $395 so initial fill of either eliquis or xarelto the initial copay will be $442. After the deductible is met, the copay will be $47/mo. [facility] has one month free trial vouchers for either DOAC.      Tracy Solano  [facility] Pharmacy Liaison  Ph: [phone] Page: [phone]

## 2021-03-30 NOTE — PROGRESS NOTES
[facility]  Cardiology Follow Up Note    Date of Admission:  3/29/2021  Consult Requested by: Dr. Tam  Reason for Consult: Massive PE with Syncope    Assessment & Plan: HVSL   Mr Bowers is a 71 year old male with history of HTN, ANCA, BMI of 38, prostate CA, and GERD who presented to an outside ED with 10+ days of progressive dyspnea and fatigue with an episode of syncope.      On arrival, patient HD stable, tachycardic to low 100s and sating 92% on RA. CTA chest revealed an acute central massive PE with bilateral extension into all lobar branches. Stat TTE revealed RV moderate RV dilation with akinetic RV free wall.  Troponin elevated to 0.06 >> 0.62 this AM; NT pro-BNP 1263. The patient underwent an emergent mechanical thrombectomy by IR; initiated on anti-coagulation w/ heparin gtt as well.      Yesterday, the patient had an episode of syncope while sitting on the commode following which a code blue was called due to concern for pulselessness (false alarm). He reportedly developed sinus bradycardia at the time w/ HR in 60's, otherwise remained HD stable (/70's). Given 500 ml LR bolus at the time. Lactate checked at the time 3.9 >> 2.5 >> 2.0 on recheck. LFT's normal.     # Acute Massive Bilateral PE with significant slot burden, unprovoked  # RV strain (moderate RVSD)  # Acute left femoral DVT  # Syncope    Overnight, the patient did well, remained asymptomatic at rest. No further episodes of syncope. Hemodynamically stable with sinus tachycardia ('s). Troponin downtrending (0.6 >> 0.08).    - Can transition high intensity heparin infusion >> to NOAC or Coumadin.  - Outpatient follow up with PCP with repeat TTE to reassess RV function in 4 weeks time.   - If the patient has residual RV dysfunction or pulmonary hypertension, please advise the PCP to refer the patient to Dr Alexy Lebron for CTEPH evaluation.         Soraya Kaiser MD   Cardiology  Fellow  [facility]  Pager: [phone]      ______________________________________________________________________      Review of Systems   The 10 point Review of Systems is negative other than noted in the HPI or here.    Past Medical History    I have reviewed this patient's medical history and updated it with pertinent information if needed.   Past Medical History:   Diagnosis Date     GERD (gastroesophageal reflux disease)        Past Surgical History   I have reviewed this patient's surgical history and updated it with pertinent information if needed.  Past Surgical History:   Procedure Laterality Date     IR PULMONARY ANGIOGRAM BILATERAL  3/29/2021       Social History   I have reviewed this patient's social history and updated it with pertinent information if needed.  Social History     Tobacco Use     Smoking status: Never Smoker   Substance Use Topics     Alcohol use: Yes     Drug use: No     Family History   I have reviewed this patient's family history and updated it with pertinent information if needed.   I have reviewed this patient's family history and updated it with pertinent information if needed.  Family History   Problem Relation Age of Onset     Prostate Cancer Father    No family history of VTE.    Medications   Current Facility-Administered Medications   Medication     acetaminophen (TYLENOL) tablet 650 mg     Anticoagulant order placed during this visit     emollient (VANICREAM) cream     heparin 25,000 units in 0.45% NaCl 250 mL ANTICOAGULANT infusion     levothyroxine (SYNTHROID/LEVOTHROID) tablet 75 mcg     lidocaine (LMX4) cream     lidocaine 1 % 0.1-1 mL     lisinopril (ZESTRIL) tablet 20 mg     melatonin tablet 3 mg     naloxone (NARCAN) injection 0.2 mg    Or     naloxone (NARCAN) injection 0.4 mg    Or     naloxone (NARCAN) injection 0.2 mg    Or     naloxone (NARCAN) injection 0.4 mg     [Held by provider] omeprazole (priLOSEC) CR capsule 20 mg      ondansetron (ZOFRAN-ODT) ODT tab 4 mg    Or     ondansetron (ZOFRAN) injection 4 mg     oxyCODONE (ROXICODONE) tablet 5-10 mg     pantoprazole (PROTONIX) IV push injection 40 mg     Patient is already receiving anticoagulation with heparin, enoxaparin (LOVENOX), warfarin (COUMADIN)  or other anticoagulant medication     polyethylene glycol (MIRALAX) Packet 17 g     senna-docusate (SENOKOT-S/PERICOLACE) 8.6-50 MG per tablet 1 tablet     sodium chloride (PF) 0.9% PF flush 3 mL     sodium chloride (PF) 0.9% PF flush 3 mL     sodium chloride (PF) 0.9% PF flush 3 mL       Allergies   No Known Allergies    Physical Exam   Vital Signs: Temp: 98  F (36.7  C) Temp src: Oral BP: (!) 132/99 Pulse: 87   Resp: 18 SpO2: 96 % O2 Device: Nasal cannula Oxygen Delivery: 4 LPM  Weight: 259 lbs 14.76 oz  Gen: alert, interactive, NAD  HEENT: atraumatic, EOMI, neck supple, no cervical adenopathy,   CV: RRR, no murmurs, rubs, or gallops  Lungs: bilaterally clear, no wheezes  Abd: soft, NT, ND, +BS  Ext: warm and well perfused, no significant LE swelling or edema  Neuro: alert and oriented x4, no focal deficits  Skin: warm and dry, no rashes on exposed surfaces    Data      I personally reviewed: EKG that shows NSR with no acute ST-T changes  Bedside limited TTE that was technically limited given body habitus, but grossly normal LVEF with moderately reduced RV with at least moderate RV dilation and septal flattening consistent with RV pressure overload. Unable to assess IVC    Results for orders placed or performed during the hospital encounter of 03/29/21 (from the past 24 hour(s))   GI LUMINAL ADULT IP CONSULT: Patient to be seen: ASAP within 4 hrs; Call back #: [phone]; hematemesis in the setting of heparin gtt for massive PE (had PERT this morning); Consultant may enter orders: Yes; Requesting provider? Hospitalist (if...    Jerman Sanchez DO     3/29/2021  4:12 PM      GASTROENTEROLOGY CONSULTATION      Date of  Admission:  3/29/2021           Reason for Consultation:   We were asked by Dr. Tam to evaluate this patient with possible   hematemesis           ASSESSMENT AND RECOMMENDATIONS:   Assessment:  71 year old male with a history of prostate cancer, ANCA on CPAP,   GERD, who was admitted to the hospital with submassive pulmonary   emboli, placed on high intensity heparin, underwent mechanical   thrombectomy, and this morning had an episode of hematemesis in   radiology.  GI is consulted for consideration of upper endoscopy.    # Hematemesis, likely related to swallowed hemoptysis  # Hemoptysis  # Submassive pulmonary embolism, on high intensity heparin  # Large hiatal hernia  Patient has been having multiple episodes of hemoptysis since   placement on anticoagulation and thrombectomy overnight, and has   been swallowing some of this. Episode of emesis by report had   some brighter red and more maroon components. Overall, most   likely etiology of his hematemesis is that of swallowed blood   from his hemoptysis. However, given his recent ibuprofen use, UGI   source is not out of the question, but felt less likely. Dwain   lesions also on the differential given hiatal hernia. Varices are   felt to be unlikely given no history of or stigmata of liver   disease. Given his overall need for anticoagulation, submassive   pulmonary emboli, and tenuous clinical status, EGD is not   recommended at this point of time. We can treat medically with   PPI.  We will continue to monitor for further episodes of   hematemesis, and ask to have his hemoptysis suctioned rather than   swallowed. If hematemesis recurs we will consider EGD at that   time.    Recommendations:  - Continue supportive cares (two large bore IVs, telemetry)   - IV PPI BID   - Suction hemoptysis rather than swallow  - OK to place patient on anticoagulation for submassive PE from   GI standpoint  - No EGD at this time  - Continue to monitor for further episodes of  hematemesis - if   recurs, keep patient NPO at midnight for possible EGD tomorrow  - If patient becomes hemodynamically unstable related to GI   bleeding, please call GI fellow on call    Gastroenterology outpatient follow up recommendations: TBD    Thank you for involving us in this patient's care. Please do not   hesitate to contact the GI service with any questions or   concerns.     Pt care plan discussed with Dr. Wang, GI staff physician.    Manish Bacon MD    Attending Attestation:  I saw the patient with Dr. Bacon and agree with the findings   and the plan of care as documented in the fellow's note. In   summary, the patient is an 71 year old male with a history of   prostate cancer, ANCA on CPAP, GERD, who was admitted to the   hospital with submassive pulmonary emboli, placed on high   intensity heparin, underwent mechanical thrombectomy, and this   morning had an episode of hematemesis in radiology.. We have been   consulted to assess the patient's hematemesis.    The patient was seen in his room on the medical floor with   respect to his hematemesis. The patient states that he has been   coughing up blood and swallowing some of it. He hemoglobin is   stable despite these events. Also normal BUN noted. At this time   we feel that the episode of hematemesis was from swallowed   hemoptysis and does not represent bleeding from within the GI   tract. That being said, he is at risk of a GI bleed and should   remain on a BID PPI. If there is evidence of a GI bleed moving   forward upper endoscopy will be considered. This will be   extremely high risk in the setting of his PE, right heart strain,   and elevated troponin. Anticoagulate as needed for PE.     Overall time spent discussing, thinking, reviewing the chart   including available imaging and labs, and evaluating the patient   was 100 minutes.    Jerman Wang DO   of Medicine  Division of Gastroenterology, Hepatology, and  [facility]      ------------------------------------------------------------------  -------------------------------------------------           History of Present Illness:   Brian Bowers is a 71 year old male with a history of prostate   cancer, ANCA on CPAP, GERD, who was admitted to the hospital with   submassive pulmonary emboli, placed on high intensity heparin,   underwent mechanical thrombectomy, and this morning had an   episode of hematemesis in radiology.  GI is consulted for   consideration of upper endoscopy.    Apparently, he was having dyspnea on exertion prior to arrival,   and ultimately had a syncopal episode prompting presentation to   the ED.  He underwent CT PE protocol and was found to have large   pulmonary embolism burden in the right/left pulmonary   vasculature, along with evidence of right heart strain and   elevated troponins, consistent with submassive pulmonary   embolism.  He was started on a heparin drip, and transferred   here, where he underwent thrombectomy overnight with IR at which   time additional 14,000 units of heparin were given. This morning,   he had an episode of bright red and maroon emesis. He had been   having ongoing hemoptysis prior, which he had been swallowing   given lack of access to suction.     Labs this morning show hemoglobin of 12.5, WBC of 15.4, platelets   of 180.  INR was 1.3.  Troponin 0 0.620, lactic acid 3.9.  BUN   25, creatinine 1.49.    Patient does take rare NSAIDs (most recently 2 days ago took for   ibuprofen, prior to that had been weeks). No history of GI bleed   or PUD. No liver disease per report or on imaging. No alcohol   use.             Past Medical History:   Reviewed and edited as appropriate  Past Medical History:   Diagnosis Date     GERD (gastroesophageal reflux disease)             Past Surgical History:   Reviewed and edited as appropriate   Past Surgical History:   Procedure Laterality Date     IR PULMONARY  ANGIOGRAM BILATERAL  3/29/2021            Previous Endoscopy:   No results found for this or any previous visit.         Social History:   Reviewed and edited as appropriate  Social History     Socioeconomic History     Marital status:      Spouse name: Not on file     Number of children: Not on file     Years of education: Not on file     Highest education level: Not on file   Occupational History     Not on file   Social Needs     Financial resource strain: Not on file     Food insecurity     Worry: Not on file     Inability: Not on file     Transportation needs     Medical: Not on file     Non-medical: Not on file   Tobacco Use     Smoking status: Never Smoker   Substance and Sexual Activity     Alcohol use: Yes     Drug use: No     Sexual activity: Yes     Partners: Female   Lifestyle     Physical activity     Days per week: Not on file     Minutes per session: Not on file     Stress: Not on file   Relationships     Social connections     Talks on phone: Not on file     Gets together: Not on file     Attends [employer] service: Not on file     Active member of club or organization: Not on file     Attends meetings of clubs or organizations: Not on file     Relationship status: Not on file     Intimate partner violence     Fear of current or ex partner: Not on file     Emotionally abused: Not on file     Physically abused: Not on file     Forced sexual activity: Not on file   Other Topics Concern     Parent/sibling w/ CABG, MI or angioplasty before 65F 55M? Not   Asked   Social History Narrative     Not on file            Family History:   Reviewed and edited as appropriate  Family History   Problem Relation Age of Onset     Prostate Cancer Father      No known history of colorectal cancer, liver disease, or   inflammatory bowel disease.         Allergies:   Reviewed and edited as appropriate   No Known Allergies         Medications:     Current Facility-Administered Medications   Medication      "acetaminophen (TYLENOL) tablet 650 mg     Anticoagulant order placed during this visit     emollient (VANICREAM) cream     heparin 25,000 units in 0.45% NaCl 250 mL ANTICOAGULANT   infusion     levothyroxine (SYNTHROID/LEVOTHROID) tablet 75 mcg     lidocaine (LMX4) cream     lidocaine 1 % 0.1-1 mL     [START ON 3/30/2021] lisinopril (ZESTRIL) tablet 20 mg     melatonin tablet 1 mg     naloxone (NARCAN) injection 0.2 mg    Or     naloxone (NARCAN) injection 0.4 mg    Or     naloxone (NARCAN) injection 0.2 mg    Or     naloxone (NARCAN) injection 0.4 mg     [Held by provider] omeprazole (priLOSEC) CR capsule 20 mg     ondansetron (ZOFRAN-ODT) ODT tab 4 mg    Or     ondansetron (ZOFRAN) injection 4 mg     oxyCODONE (ROXICODONE) tablet 5-10 mg     pantoprazole (PROTONIX) IV push injection 40 mg     Patient is already receiving anticoagulation with heparin,   enoxaparin (LOVENOX), warfarin (COUMADIN)  or other anticoagulant   medication     polyethylene glycol (MIRALAX) Packet 17 g     sodium chloride (PF) 0.9% PF flush 3 mL     sodium chloride (PF) 0.9% PF flush 3 mL     sodium chloride (PF) 0.9% PF flush 3 mL             Review of Systems:     A complete 10 point review of systems was performed and is   negative except as noted in the HPI           Physical Exam:   BP (!) 151/94 (BP Location: Left arm)   Pulse 112   Temp 97.6    F (36.4  C) (Oral)   Resp 22   Ht 1.753 m (5' 9\")   Wt 117.9   kg (259 lb 14.8 oz)   SpO2 100%   BMI 38.38 kg/m    Wt:   Wt Readings from Last 2 Encounters:   03/29/21 117.9 kg (259 lb 14.8 oz)   03/28/21 116.6 kg (257 lb)      Constitutional: No acute distress, resting comfortably in bed,   suction canister noted with blood in it from oral cavity and from   what patient describes is coughed up blood  Eyes: Sclera anicteric  Ears/nose/mouth/throat: Moist mucus membranes, hearing intact.   Dried blood around gown but no blood in the oropharynx on my   exam.  Neck: supple  CV: No " edema  Respiratory: Breathing comfortably on room air  Abd: Soft, nontender, nondistended, bowel sounds present.   Skin: warm, perfused, no jaundice. No stigmata of liver disease.   Neuro: AAO x 3  Psych: Normal affect  MSK: No gross deformities         Data:   Labs and imaging below were independently reviewed and   interpreted    BMP  Recent Labs   Lab 03/29/21  0940 03/29/21  0900 03/29/21  0131 03/28/21  1837    139 138 139   POTASSIUM 4.0 3.2* 3.8 3.8   CHLORIDE 108 106 107 105   MAURICIO 8.1* 8.3* 8.8 8.4*   CO2 20 25 22 25   BUN 25 11 19 18   CR 1.49* 0.77 1.26* 1.51*   * 107* 116* 111*     CBC  Recent Labs   Lab 03/29/21  0940 03/29/21  0900 03/29/21  0131 03/28/21  1837   WBC 15.4* 13.1* 9.1 10.5   RBC 3.93* 3.67* 4.23* 4.47   HGB 12.5* 11.6* 13.3 14.4   HCT 39.8* 35.9* 41.0 43.4   * 98 97 97   MCH 31.8 31.6 31.4 32.2   MCHC 31.4* 32.3 32.4 33.2   RDW 12.9 13.0 12.9 12.7    172 157 187     INR  Recent Labs   Lab 03/29/21  0940 03/29/21  0900   INR 1.30* 1.34*     LFTs  Recent Labs   Lab 03/29/21  1101   ALKPHOS 49   AST 25   ALT 27   BILITOTAL 0.5   PROTTOTAL 6.5*   ALBUMIN 2.7*      PANCNo lab results found in last 7 days.    Imaging:   IMPRESSION:  1.  Pulmonary embolism is present, large clot burden with   evidence for elevated right heart pressure.  2.  Large hiatal hernia.   Heparin Unfractionated Anti Xa Level   Result Value Ref Range    Heparin Unfractionated Anti Xa Level >1.10 (HH) IU/mL   Heparin Unfractionated Anti Xa Level   Result Value Ref Range    Heparin Unfractionated Anti Xa Level >1.10 (HH) IU/mL   ABO/Rh type and screen   Result Value Ref Range    ABO O     RH(D) Pos     Antibody Screen Neg     Test Valid Only At          [facility]    Specimen Expires 04/01/2021    NT proBNP inpatient and ED   Result Value Ref Range    N-Terminal Pro BNP Inpatient 1,263 (H) 0 - 900 pg/mL   INR   Result Value Ref Range    INR 1.34 (H)  0.86 - 1.14   Basic metabolic panel   Result Value Ref Range    Sodium 139 133 - 144 mmol/L    Potassium 3.2 (L) 3.4 - 5.3 mmol/L    Chloride 106 94 - 109 mmol/L    Carbon Dioxide 25 20 - 32 mmol/L    Anion Gap 7 3 - 14 mmol/L    Glucose 107 (H) 70 - 99 mg/dL    Urea Nitrogen 11 7 - 30 mg/dL    Creatinine 0.77 0.66 - 1.25 mg/dL    GFR Estimate >90 >60 mL/min/[1.73_m2]    GFR Estimate If Black >90 >60 mL/min/[1.73_m2]    Calcium 8.3 (L) 8.5 - 10.1 mg/dL   CBC with platelets   Result Value Ref Range    WBC 13.1 (H) 4.0 - 11.0 10e9/L    RBC Count 3.67 (L) 4.4 - 5.9 10e12/L    Hemoglobin 11.6 (L) 13.3 - 17.7 g/dL    Hematocrit 35.9 (L) 40.0 - 53.0 %    MCV 98 78 - 100 fl    MCH 31.6 26.5 - 33.0 pg    MCHC 32.3 31.5 - 36.5 g/dL    RDW 13.0 10.0 - 15.0 %    Platelet Count 172 150 - 450 10e9/L   XR Chest Port 1 View    Narrative    Exam: XR CHEST PORT 1 VW, 3/29/2021 9:40 AM    Indication: syncopal episode, gurgling breath sounds    Comparison: CT dated 3/29/2020    Findings:   New patchy airspace opacity in the right upper lobe. Heart is mildly  enlarged. No significant pleural effusions.      Impression    Impression: New right upper lobe airspace opacity. This is the  distribution of the known pulmonary embolism. Although this could be  an evolving infarct as a the sequela of that embolism, pneumonia would  have to be strongly considered. This does not have the appearance of  atelectasis.    GABRIEL HILL MD   Basic metabolic panel   Result Value Ref Range    Sodium 137 133 - 144 mmol/L    Potassium 4.0 3.4 - 5.3 mmol/L    Chloride 108 94 - 109 mmol/L    Carbon Dioxide 20 20 - 32 mmol/L    Anion Gap 9 3 - 14 mmol/L    Glucose 193 (H) 70 - 99 mg/dL    Urea Nitrogen 25 7 - 30 mg/dL    Creatinine 1.49 (H) 0.66 - 1.25 mg/dL    GFR Estimate 47 (L) >60 mL/min/[1.73_m2]    GFR Estimate If Black 54 (L) >60 mL/min/[1.73_m2]    Calcium 8.1 (L) 8.5 - 10.1 mg/dL   CBC with platelets   Result Value Ref Range    WBC 15.4 (H) 4.0 -  11.0 10e9/L    RBC Count 3.93 (L) 4.4 - 5.9 10e12/L    Hemoglobin 12.5 (L) 13.3 - 17.7 g/dL    Hematocrit 39.8 (L) 40.0 - 53.0 %     (H) 78 - 100 fl    MCH 31.8 26.5 - 33.0 pg    MCHC 31.4 (L) 31.5 - 36.5 g/dL    RDW 12.9 10.0 - 15.0 %    Platelet Count 180 150 - 450 10e9/L   INR   Result Value Ref Range    INR 1.30 (H) 0.86 - 1.14   Lactic acid whole blood   Result Value Ref Range    Lactic Acid 3.9 (H) 0.7 - 2.0 mmol/L   Magnesium   Result Value Ref Range    Magnesium 2.1 1.6 - 2.3 mg/dL   Phosphorus   Result Value Ref Range    Phosphorus 3.0 2.5 - 4.5 mg/dL   Partial thromboplastin time   Result Value Ref Range     (HH) 22 - 37 sec   Troponin I   Result Value Ref Range    Troponin I ES 0.620 (HH) 0.000 - 0.045 ug/L   Hepatic panel   Result Value Ref Range    Bilirubin Direct <0.1 0.0 - 0.2 mg/dL    Bilirubin Total 0.5 0.2 - 1.3 mg/dL    Albumin 2.7 (L) 3.4 - 5.0 g/dL    Protein Total 6.5 (L) 6.8 - 8.8 g/dL    Alkaline Phosphatase 49 40 - 150 U/L    ALT 27 0 - 70 U/L    AST 25 0 - 45 U/L   Lactic acid whole blood   Result Value Ref Range    Lactic Acid 2.5 (H) 0.7 - 2.0 mmol/L   US Lower Extremity Venous Duplex Bilateral   Result Value Ref Range    Radiologist flags DVT (Urgent)     Narrative    EXAMINATION: US LOWER EXTREMITY VENOUS DUPLEX BILATERAL, 3/29/2021  11:10 AM     COMPARISON: None.    HISTORY: acute PE    TECHNIQUE:  Gray-scale evaluation with compression, spectral flow and  color Doppler assessment of the deep venous system of both legs from  groin to knee, and then at the ankles.    FINDINGS:  In the right lower extremity, the common femoral, femoral, popliteal,  and posterior tibial veins demonstrate normal compressibility and  blood flow.    In the left lower extremity, the left femoral vein is partially  compressible in the upper thigh, noncompressible in the mid thigh, and  partially compressible in the distal thigh. The common femoral,  popliteal, and posterior tibial veins  demonstrate normal  compressibility and blood flow.      Impression    IMPRESSION:    1. Occlusive and nonocclusive thrombus throughout the left femoral  vein.  2. No evidence of deep venous thrombosis in the right lower extremity.      [Access Center: DVT]    This report will be copied to the [facility] to ensure a  provider acknowledges the finding. Access Center is available Monday  through Friday 8am-3:30 pm.     FRANNIE MONREAL DO   Echo Limited    Narrative    [id number]  [id number]  [id number]  [id number]^CLAUDE^COOPER^PATRICIO     [facility],[place]  Echocardiography Laboratory  [address] [place] [id number]     Name: TEO MEDINA  MRN: [id number]  : 1950  Study Date: 2021 10:50 AM  Age: 71 yrs  Gender: Male  Patient Location: [facility]  Reason For Study: Pulmonary Emboli  Ordering Physician: COOPER ARCE  Referring Physician: MIRIAM KUMAR  Performed By: Aaron Gamboa RDCS     BSA: 2.3 m2  Height: 69 in  Weight: 259 lb  HR: 114  BP: 133/97 mmHg  ______________________________________________________________________________  Procedure  Limited Portable Echo Adult. Contrast Optison. Optison (NDC #2077-0045-29)  given intravenously. Patient was given 6 ml mixture of 3 ml Optison and 6 ml  saline. 3 ml wasted.  ______________________________________________________________________________  Interpretation Summary  Global and regional left ventricular function is hyperkinetic with an EF >70%.  Moderate RV dilation with akinetic RV free wall and normal contraction of RV  apex,typical for massive pulmonaru embolism.  ______________________________________________________________________________  Left Ventricle  Global and regional left ventricular function is hyperkinetic with an EF >70%.     Right Ventricle  Moderate RV dilation with akinetic RV free wall and normal contraction of RV  apex,typical for massive pulmonaru embolism.      Tricuspid Valve  Trace tricuspid insufficiency is present. The right ventricular systolic  pressure is approximated at 30.6 mmHg plus the right atrial pressure.     Pericardium  No pericardial effusion is present.     ______________________________________________________________________________  MMode/2D Measurements & Calculations  TAPSE: 1.3 cm     Doppler Measurements & Calculations  TR max jolly: 276.6 cm/sec  TR max P.6 mmHg     ______________________________________________________________________________  Report approved by: Princess Quispe 2021 11:39 AM         Heparin Unfractionated Anti Xa Level   Result Value Ref Range    Heparin Unfractionated Anti Xa Level 0.53 IU/mL   Lactic acid whole blood   Result Value Ref Range    Lactic Acid 2.0 0.7 - 2.0 mmol/L   Hemoglobin   Result Value Ref Range    Hemoglobin 11.5 (L) 13.3 - 17.7 g/dL   Troponin I   Result Value Ref Range    Troponin I ES 0.171 (HH) 0.000 - 0.045 ug/L   Hemoglobin   Result Value Ref Range    Hemoglobin 10.2 (L) 13.3 - 17.7 g/dL   Troponin I   Result Value Ref Range    Troponin I ES 0.125 (HH) 0.000 - 0.045 ug/L   Heparin Unfractionated Anti Xa Level   Result Value Ref Range    Heparin Unfractionated Anti Xa Level 0.48 IU/mL   Basic metabolic panel   Result Value Ref Range    Sodium 136 133 - 144 mmol/L    Potassium 4.1 3.4 - 5.3 mmol/L    Chloride 106 94 - 109 mmol/L    Carbon Dioxide 21 20 - 32 mmol/L    Anion Gap 9 3 - 14 mmol/L    Glucose 131 (H) 70 - 99 mg/dL    Urea Nitrogen 24 7 - 30 mg/dL    Creatinine 1.30 (H) 0.66 - 1.25 mg/dL    GFR Estimate 55 (L) >60 mL/min/[1.73_m2]    GFR Estimate If Black 64 >60 mL/min/[1.73_m2]    Calcium 8.4 (L) 8.5 - 10.1 mg/dL   CBC with platelets   Result Value Ref Range    WBC 10.5 4.0 - 11.0 10e9/L    RBC Count 3.21 (L) 4.4 - 5.9 10e12/L    Hemoglobin 10.3 (L) 13.3 - 17.7 g/dL    Hematocrit 32.0 (L) 40.0 - 53.0 %     78 - 100 fl    MCH 32.1 26.5 - 33.0 pg    MCHC 32.2 31.5 - 36.5 g/dL     RDW 12.8 10.0 - 15.0 %    Platelet Count 138 (L) 150 - 450 10e9/L   Troponin I   Result Value Ref Range    Troponin I ES 0.088 (H) 0.000 - 0.045 ug/L

## 2021-03-30 NOTE — PLAN OF CARE
"/82   Pulse 95   Temp 98.7  F (37.1  C) (Oral)   Resp 20   Ht 1.753 m (5' 9\")   Wt 117.9 kg (259 lb 14.8 oz)   SpO2 94%   BMI 38.38 kg/m      VSS. Oxygen weaned to 3L NC. A&O x4, forgetful about the days events. Denies pain. Woggle suture in R groin removed earlier today, bandage with small amt drainage. Tolerating clear liquids. Voiding adequately. Hep gtt infusing. Off bedrest. Continue to monitor.   "

## 2021-03-30 NOTE — PLAN OF CARE
"BP 95/76   Pulse 100   Temp 97.6  F (36.4  C) (Oral)   Resp 16   Ht 1.753 m (5' 9\")   Wt 117.9 kg (259 lb 14.8 oz)   SpO2 90%   BMI 38.38 kg/m      Hours of Care: 15:30 - 23:30.    Neuro: A/Ox4. 1 episode of syncope while seated in recliner lasting appx 10 at start of shift, witnessed by pt's girlfriend. Team aware.   Cardiac: SR-ST with HR 100s. BP 80s-90s / 60s at 16:00 check, 1L LR Bolus given.  Respiratory: Sating >92 on RA while awake, requiring 2L NC to maintain sats >90 while asleep. Lung sounds clear.  GI/: No BM this shift.  over last 8 hours, team notified.  Diet: Tolerating regular diet, appetite fair  Pain: No complaints of pain this shift  Activity: Assist of 1, up in room ?  LDAs:  2LPIV: SL  Skin/Wounds: No new deficits noted. Groin site with dried drainage, hematoma unchanged.    Heparin gtt stopped this afternoon and PO Eliquis started.   ?    Plan:??  Continue to monitor and follow POC  Britni Man RN on 3/30/2021 at ?    ?    "

## 2021-03-30 NOTE — PLAN OF CARE
VSS. Oxygen to 3-4L NC, hx ANCA. A&O x4, forgetful about the days events. Denies pain. Right groin site with lump tender to palpation (MD notified) and bandage with small amt drainage. Full liquid diet. Voiding via urinal, encourage PO. Hep gtt infusing, in goal range. Continue to monitor.

## 2021-03-31 ENCOUNTER — PATIENT OUTREACH (OUTPATIENT)
Dept: CARE COORDINATION | Facility: CLINIC | Age: 71
End: 2021-03-31

## 2021-03-31 VITALS
TEMPERATURE: 98.2 F | WEIGHT: 259.92 LBS | HEIGHT: 69 IN | BODY MASS INDEX: 38.5 KG/M2 | RESPIRATION RATE: 18 BRPM | HEART RATE: 88 BPM | DIASTOLIC BLOOD PRESSURE: 62 MMHG | SYSTOLIC BLOOD PRESSURE: 103 MMHG | OXYGEN SATURATION: 98 %

## 2021-03-31 LAB
ANION GAP SERPL CALCULATED.3IONS-SCNC: 6 MMOL/L (ref 3–14)
APTT PPP: 38 SEC (ref 22–37)
BASOPHILS # BLD AUTO: 0.1 10E9/L (ref 0–0.2)
BASOPHILS NFR BLD AUTO: 0.7 %
BUN SERPL-MCNC: 25 MG/DL (ref 7–30)
CALCIUM SERPL-MCNC: 8.1 MG/DL (ref 8.5–10.1)
CHLORIDE SERPL-SCNC: 106 MMOL/L (ref 94–109)
CO2 SERPL-SCNC: 24 MMOL/L (ref 20–32)
CREAT SERPL-MCNC: 1.42 MG/DL (ref 0.66–1.25)
CREAT SERPL-MCNC: 1.61 MG/DL (ref 0.66–1.25)
DIFFERENTIAL METHOD BLD: ABNORMAL
EOSINOPHIL # BLD AUTO: 0.5 10E9/L (ref 0–0.7)
EOSINOPHIL NFR BLD AUTO: 6.6 %
ERYTHROCYTE [DISTWIDTH] IN BLOOD BY AUTOMATED COUNT: 12.9 % (ref 10–15)
ERYTHROCYTE [DISTWIDTH] IN BLOOD BY AUTOMATED COUNT: 13 % (ref 10–15)
FIBRINOGEN PPP-MCNC: 431 MG/DL (ref 200–420)
GFR SERPL CREATININE-BSD FRML MDRD: 42 ML/MIN/{1.73_M2}
GFR SERPL CREATININE-BSD FRML MDRD: 49 ML/MIN/{1.73_M2}
GLUCOSE SERPL-MCNC: 115 MG/DL (ref 70–99)
HCT VFR BLD AUTO: 25.6 % (ref 40–53)
HCT VFR BLD AUTO: 27.2 % (ref 40–53)
HGB BLD-MCNC: 8.3 G/DL (ref 13.3–17.7)
HGB BLD-MCNC: 8.7 G/DL (ref 13.3–17.7)
IMM GRANULOCYTES # BLD: 0 10E9/L (ref 0–0.4)
IMM GRANULOCYTES NFR BLD: 0.3 %
INR PPP: 1.56 (ref 0.86–1.14)
LYMPHOCYTES # BLD AUTO: 1.7 10E9/L (ref 0.8–5.3)
LYMPHOCYTES NFR BLD AUTO: 25 %
MCH RBC QN AUTO: 31.4 PG (ref 26.5–33)
MCH RBC QN AUTO: 31.8 PG (ref 26.5–33)
MCHC RBC AUTO-ENTMCNC: 32 G/DL (ref 31.5–36.5)
MCHC RBC AUTO-ENTMCNC: 32.4 G/DL (ref 31.5–36.5)
MCV RBC AUTO: 98 FL (ref 78–100)
MCV RBC AUTO: 98 FL (ref 78–100)
MONOCYTES # BLD AUTO: 0.4 10E9/L (ref 0–1.3)
MONOCYTES NFR BLD AUTO: 6.3 %
NEUTROPHILS # BLD AUTO: 4.2 10E9/L (ref 1.6–8.3)
NEUTROPHILS NFR BLD AUTO: 61.1 %
NRBC # BLD AUTO: 0 10*3/UL
NRBC BLD AUTO-RTO: 0 /100
PLATELET # BLD AUTO: 106 10E9/L (ref 150–450)
PLATELET # BLD AUTO: 129 10E9/L (ref 150–450)
POTASSIUM SERPL-SCNC: 4.1 MMOL/L (ref 3.4–5.3)
RBC # BLD AUTO: 2.61 10E12/L (ref 4.4–5.9)
RBC # BLD AUTO: 2.77 10E12/L (ref 4.4–5.9)
RETICS # AUTO: 78.4 10E9/L (ref 25–95)
RETICS/RBC NFR AUTO: 2.8 % (ref 0.5–2)
SODIUM SERPL-SCNC: 136 MMOL/L (ref 133–144)
WBC # BLD AUTO: 5.9 10E9/L (ref 4–11)
WBC # BLD AUTO: 6.8 10E9/L (ref 4–11)

## 2021-03-31 PROCEDURE — 999N001086 HC STATISTIC MORPHOLOGY W/INTERP HEMEPATH TC 85060: Performed by: INTERNAL MEDICINE

## 2021-03-31 PROCEDURE — 85384 FIBRINOGEN ACTIVITY: CPT | Performed by: INTERNAL MEDICINE

## 2021-03-31 PROCEDURE — 99207 PR APP CREDIT; MD BILLING SHARED VISIT: CPT | Performed by: PHYSICIAN ASSISTANT

## 2021-03-31 PROCEDURE — 86147 CARDIOLIPIN ANTIBODY EA IG: CPT | Performed by: INTERNAL MEDICINE

## 2021-03-31 PROCEDURE — 85730 THROMBOPLASTIN TIME PARTIAL: CPT | Performed by: INTERNAL MEDICINE

## 2021-03-31 PROCEDURE — 80048 BASIC METABOLIC PNL TOTAL CA: CPT | Performed by: INTERNAL MEDICINE

## 2021-03-31 PROCEDURE — 85045 AUTOMATED RETICULOCYTE COUNT: CPT | Performed by: INTERNAL MEDICINE

## 2021-03-31 PROCEDURE — 86146 BETA-2 GLYCOPROTEIN ANTIBODY: CPT | Performed by: INTERNAL MEDICINE

## 2021-03-31 PROCEDURE — 85025 COMPLETE CBC W/AUTO DIFF WBC: CPT | Performed by: INTERNAL MEDICINE

## 2021-03-31 PROCEDURE — 99239 HOSP IP/OBS DSCHRG MGMT >30: CPT | Performed by: STUDENT IN AN ORGANIZED HEALTH CARE EDUCATION/TRAINING PROGRAM

## 2021-03-31 PROCEDURE — 250N000013 HC RX MED GY IP 250 OP 250 PS 637: Performed by: INTERNAL MEDICINE

## 2021-03-31 PROCEDURE — 85060 BLOOD SMEAR INTERPRETATION: CPT | Performed by: PATHOLOGY

## 2021-03-31 PROCEDURE — 258N000003 HC RX IP 258 OP 636: Performed by: PHYSICIAN ASSISTANT

## 2021-03-31 PROCEDURE — 36415 COLL VENOUS BLD VENIPUNCTURE: CPT | Performed by: INTERNAL MEDICINE

## 2021-03-31 PROCEDURE — 85027 COMPLETE CBC AUTOMATED: CPT | Performed by: INTERNAL MEDICINE

## 2021-03-31 PROCEDURE — 99223 1ST HOSP IP/OBS HIGH 75: CPT | Mod: GC | Performed by: INTERNAL MEDICINE

## 2021-03-31 PROCEDURE — 36415 COLL VENOUS BLD VENIPUNCTURE: CPT | Performed by: PHYSICIAN ASSISTANT

## 2021-03-31 PROCEDURE — 85610 PROTHROMBIN TIME: CPT | Performed by: INTERNAL MEDICINE

## 2021-03-31 PROCEDURE — 250N000013 HC RX MED GY IP 250 OP 250 PS 637: Performed by: PHYSICIAN ASSISTANT

## 2021-03-31 PROCEDURE — 82565 ASSAY OF CREATININE: CPT | Performed by: PHYSICIAN ASSISTANT

## 2021-03-31 RX ORDER — PANTOPRAZOLE SODIUM 40 MG/1
40 TABLET, DELAYED RELEASE ORAL
Qty: 60 TABLET | Refills: 2 | Status: SHIPPED | OUTPATIENT
Start: 2021-04-01

## 2021-03-31 RX ADMIN — PANTOPRAZOLE SODIUM 40 MG: 40 TABLET, DELAYED RELEASE ORAL at 08:49

## 2021-03-31 RX ADMIN — LISINOPRIL 20 MG: 10 TABLET ORAL at 08:50

## 2021-03-31 RX ADMIN — APIXABAN 10 MG: 5 TABLET, FILM COATED ORAL at 08:49

## 2021-03-31 RX ADMIN — LEVOTHYROXINE SODIUM 75 MCG: 75 TABLET ORAL at 08:50

## 2021-03-31 RX ADMIN — PANTOPRAZOLE SODIUM 40 MG: 40 TABLET, DELAYED RELEASE ORAL at 16:48

## 2021-03-31 RX ADMIN — SODIUM CHLORIDE, POTASSIUM CHLORIDE, SODIUM LACTATE AND CALCIUM CHLORIDE 1000 ML: 600; 310; 30; 20 INJECTION, SOLUTION INTRAVENOUS at 08:54

## 2021-03-31 ASSESSMENT — ACTIVITIES OF DAILY LIVING (ADL)
ADLS_ACUITY_SCORE: 19

## 2021-03-31 NOTE — PROGRESS NOTES
Shift: 0700 - 1730  VS: Temp: 98.2  F (36.8  C) Temp src: Oral BP: 103/62 Pulse: 88   Resp: 18 SpO2: 98 % O2 Device: None (Room air) Oxygen Delivery: 2 LPM  Pain: denies pain.   Neuro: A&Ox4.  Cardiac:   Soft BP, OVSS. SR.   Respiratory: Lung sounds clear/diminished on RA. CPAP at night, did not have CPAP here so patient had 2L NC at rest.   GI/Diet/Appetite: Regular diet with good appetite. LBM 3/29.   :  AUOP.   LDA's: PIV LUE x2 removed prior to discharge.   Skin: Intact.   Activity: SBA  Tests/Procedures:   Pertinent Labs/Lab Collection:      Plan: Started on Eliquis this admission, teaching implement during discharge about this med. Handout given. Patient's wife was also present. Dosing for Eliquis reviewed with patient and his wife. Patient instructed to  meds at pharmacy prior to leaving. Transported to [facility] by  with staff.

## 2021-03-31 NOTE — CONSULTS
Hematology  Consult Note   Date of Service: 03/31/2021    Patient: Brian Bowers  MRN: [id number]  Admission Date: 3/29/2021  Hospital Day # 2     Reason for Consult: Acute massive PE, LLE DVT, cytopenias      History of Present Illness:    Brian Bowers is a 71 year old male with PMH significant for prostate cancer (dx in 2019, treated w radiation tx), psoriasis, hypothyroidism, ANCA on CPAP, GERD and hypertension who was admitted to [facility] on 3/29 with hypoxia and found to have acute massive B/L PE requiring IR-guided mechanical thrombectomy and also found to have LLE DVT initially started on heparin drip and now transitioned to apixaban.     Patient presented to an outside ED with 10+ days of progressive dyspnea and fatigue with LOC while walking. He was unconscious for approximately two minutes before he awoke spontaneously. In the outside ED, he was found to have acute bilateral PE with large clot burden and RV strain. He was started on a high intensity heparin gtt and sent to [facility] for further management. Upon arrival to [facility], PERT team was activated. Bedside limited TTE was technically difficult given body habitus, but showed at least moderate RV dilation with moderately reduced RV function and grossly normal LVEF with septal flattening.     He underwent mechanical thrombectomy of B/L pulmonary arteries on 3/29.     Doppler LE showed occlusive and nonocclusive thrombus throughout the left femoral vein on 3/29.     Heparin drip was held shortly morning of 3/29 due to vomiting blood, per GI consult, hematemesis more likely related to swallowed hemoptysis but unlikely acute GI bleed thus heparin was resumed. He was started on IV PPI BID which has since been switched to oral and GI has signed off. Also, code blue was called due to subsequent syncopal episode 3/29 while on the commode. Tele showed sinus bradycardia with HR in 50-60s but BP was okay. Troponin elevated 0.62 and BNP 1263. Lactate checked at the time  3.9-->2.5 after 500 LR fluid bolus.      He was tansitioned to apixaban yesterday 3/30 at 1830.       Review of Systems:  Upon bedside evaluation, patient was doing well. He denied SOB or chest pain. A comprehensive ROS was performed and found to be negative or non-contributory with the exception of that noted in the HPI above.    Past Medical History:  Past Medical History:   Diagnosis Date     GERD (gastroesophageal reflux disease)        Past Surgical History:  Past Surgical History:   Procedure Laterality Date     IR PULMONARY ANGIOGRAM BILATERAL  3/29/2021       Social History:  Social History     Socioeconomic History     Marital status:      Spouse name: Not on file     Number of children: Not on file     Years of education: Not on file     Highest education level: Not on file   Occupational History     Not on file   Social Needs     Financial resource strain: Not on file     Food insecurity     Worry: Not on file     Inability: Not on file     Transportation needs     Medical: Not on file     Non-medical: Not on file   Tobacco Use     Smoking status: Never Smoker   Substance and Sexual Activity     Alcohol use: Yes     Drug use: No     Sexual activity: Yes     Partners: Female   Lifestyle     Physical activity     Days per week: Not on file     Minutes per session: Not on file     Stress: Not on file   Relationships     Social connections     Talks on phone: Not on file     Gets together: Not on file     Attends Protestant service: Not on file     Active member of club or organization: Not on file     Attends meetings of clubs or organizations: Not on file     Relationship status: Not on file     Intimate partner violence     Fear of current or ex partner: Not on file     Emotionally abused: Not on file     Physically abused: Not on file     Forced sexual activity: Not on file   Other Topics Concern     Parent/sibling w/ CABG, MI or angioplasty before 65F 55M? Not Asked   Social History Narrative      "Not on file        Family History:  Family History   Problem Relation Age of Onset     Prostate Cancer Father        Inpatient Medications:    apixaban ANTICOAGULANT  10 mg Oral BID     levothyroxine  75 mcg Oral QAM AC     lisinopril  20 mg Oral Daily     pantoprazole  40 mg Oral BID AC     senna-docusate  1 tablet Oral At Bedtime     sodium chloride (PF)  3 mL Intracatheter Q8H       Physical Exam:    Blood pressure 125/71, pulse 75, temperature 97.9  F (36.6  C), temperature source Oral, resp. rate 18, height 1.753 m (5' 9\"), weight 117.9 kg (259 lb 14.8 oz), SpO2 96 %.  General: No acute distress, alert and cooperative  HEENT: NC/AT, PERRLA, EOMI, oropharynx unremarkable  Neck: Supple, normal ROM  CV: RRR  Resp: CTAB, normal respiratory effort on ambient air  GI: Soft, non-tender, non-distended, bowel sounds present and normoactive  Extremities: Mild asymmetrical swelling involving the LLE   Skin: Psoriatic plagues seen on both legs, abdomen and back  Neuro: No gross focal deficits    Labs & Studies: I personally reviewed the following studies:  ROUTINE LABS (Last four results):  CMP  Recent Labs   Lab 03/31/21  0534 03/30/21  0409 03/29/21  1101 03/29/21  0940 03/29/21  0900    136  --  137 139   POTASSIUM 4.1 4.1  --  4.0 3.2*   CHLORIDE 106 106  --  108 106   CO2 24 21  --  20 25   ANIONGAP 6 9  --  9 7   * 131*  --  193* 107*   BUN 25 24  --  25 11   CR 1.61* 1.30*  --  1.49* 0.77   GFRESTIMATED 42* 55*  --  47* >90   GFRESTBLACK 49* 64  --  54* >90   MAURICIO 8.1* 8.4*  --  8.1* 8.3*   MAG  --   --   --  2.1  --    PHOS  --   --   --  3.0  --    PROTTOTAL  --   --  6.5*  --   --    ALBUMIN  --   --  2.7*  --   --    BILITOTAL  --   --  0.5  --   --    ALKPHOS  --   --  49  --   --    AST  --   --  25  --   --    ALT  --   --  27  --   --      CBC  Recent Labs   Lab 03/31/21  0534 03/30/21  0959 03/30/21  0409 03/29/21  2142 03/29/21  0940 03/29/21  0940 03/29/21  0900   WBC 5.9  --  10.5  --   --  " 15.4* 13.1*   RBC 2.61*  --  3.21*  --   --  3.93* 3.67*   HGB 8.3* 9.5* 10.3* 10.2*   < > 12.5* 11.6*   HCT 25.6*  --  32.0*  --   --  39.8* 35.9*   MCV 98  --  100  --   --  101* 98   MCH 31.8  --  32.1  --   --  31.8 31.6   MCHC 32.4  --  32.2  --   --  31.4* 32.3   RDW 12.9  --  12.8  --   --  12.9 13.0   *  --  138*  --   --  180 172    < > = values in this interval not displayed.     INR  Recent Labs   Lab 03/29/21  0940 03/29/21  0900   INR 1.30* 1.34*       Assessment & Recommendations:   Brian Bowers is a 71 year old male with PMH significant for prostate cancer (Dx in 2019, treated w radiation tx), psoriasis, hypothyroidism, ANCA on CPAP, GERD and hypertension who was admitted to [facility] on 3/29 with hypoxia and found to have acute massive B/L PE requiring IR-guided mechanical thrombectomy as well as LLE DVT initially started on heparin drip and now transitioned to apixaban.    # Acute massive B/L PE, unprovoked  # L femoral vein DVT, unprovoked  Patient has chronic underlying risk factors in the form of obesity, sedentary life style (exacerbated by COVID-related shut downs of gyms), and psoriasis but no clear acute provoking factors. He might have an underlying immunological predilection with the psoriasis but he has had no recent exacerbations and disease has been under relatively good control. No personal or family history of VTE. No recent long car/plane rides or recent hospitalizations. No history of current smoking (quit when he was 27 years of age). Baseline D-dimer for reference for future prognostication=13.3.    - Given unprovoked VTE, we recommend antiphospholipid syndrome work-up. While anticardiolipin and beta-glycoprotein IgG/IgM should be relatively unaffected by acute thrombosis and ongoing therapy with apixaban and recent heparin, lupus anticoagulant portion of the work-up could be affected. Therefore we will go ahead and order the former two and defer to obtain the latter later when  patient follows in the clinic.  - Given high recurrence rate of 6-11% in a male patient with an unprovoked PE/DVT, our recommendation is anticoagulation ideally for an indefinite period. We discussed this with him and he seemed agreeable.  - He should be referred to be seen at the center for bleeding and clotting disorders in 1 month for an interval check and decide on further management.   - Agree with outpatient follow up with PCP with repeat TTE to reassess RV function in 4 weeks time.       # Cytopenia  # Thrombocytopenia  Given decrease in all three cell lines, thrombocytopenia seems most likely hemo-dilutional in nature. A component of thrombocytopenia could also be consumptive from clotting and post-IR thrombectomy. No other apparent cause. No infection. No recent blood products to suspect transfusion reaction or post-transfusion thrombocytopenia. 4-T score indicates low probability for HIT. INR, PTT and fibrinogen unremarkable. Blood smear is currently pending but clinical picture does not seem compatible with DIC or TTP. No new culprit medications. The only new medication that was added inpatient was pantoprazole which has been associated with ~2% increased risk of leukopenia and thrombocytopenia but it does not seem excessive. Both WBC and platelets came up slightly to 6.8 and 129 respectively on the latest CBC done 1432 today which is reasuring.    From our standpoint, patient can discharge with follow in the center for bleeding and clotting disorders in 1 month. Meanwhile, he should follow-up with his PCP for a post-hospital discharge wellness check when he should also get a repeat CBC.     We will sign off. Patient was seen and plan of care was discussed with attending physician Dr. Mccormick.    John Mckeon  Hematology/Oncology Fellow  [facility]  Pager: [phone]  Attending  The patient was seen and examined by me separate from the resident/fellow provider.The note above reflects my  assessment and plan. I have personally reviewed today's labs,vital and radiology results. The points of care that were added by me are:    I am just not sure why he had such a massive PE unprovoked. Could he have had COVID? Did the vaccine play a role?Was an immune process related to psoriasis activated such as antiphospholipid. He needs prolonged anticoagulation and further workup at the [facility]eding [facility]ting [facility] clinic  Art Mccormick M.D.  [phone]

## 2021-03-31 NOTE — DISCHARGE SUMMARY
[facility]  Hospitalist Discharge Summary      Date of Admission:  3/29/2021  Date of Discharge:  3/31/2021  Discharging Provider: Mabel Cervantes PA-C/ Dr. Palacio  Discharge Team: Hospitalist Service, [place]     Discharge Diagnoses   Massive bilateral PE w/ syncope  Hematemesis, likely related to swallowed hemoptysis  REANNA on CKD  Hypothyroidism  HTN  ANCA on CPAP  GERD  Hx of large hiatal hernia  Psoriasis    Follow-ups Needed After Discharge   Follow-up Appointments     Adult [facility] Follow-up and recommended labs and tests      Follow up with primary care provider, Jovon Deutsch, as soon as   possible. You will need a repeat echo in 4 weeks time. This can be ordered   by your PCP.  Follow up with hematology in 3 months.    Appointments on [place] and/or [place] campus (with [facility] or [facility]   provider or service). Call [phone] if you haven't heard regarding   these appointments within 7 days of discharge.         Follow Up and recommended labs and tests      Repeat ECHO in 4 weeks          If RV dysfunction or pulmonary hypertension seen on repeat ECHO, then please refer patient to Dr. Alexy Lebron for CTEPH evaluation.    Unresulted Labs Ordered in the Past 30 Days of this Admission     Date and Time Order Name Status Description    3/31/2021 1213 Blood Morphology Pathologist Review In process       These results will be followed up by hematology    Discharge Disposition   Discharged to home  Condition at discharge: Stable      Hospital Course   Brian Bowers is a 71 year old male with a hx of prostate cancer (dx in 2019, treated w/ radiation tx), psoriasis, hypothyroidism, ANCA on CPAP, GERD, and hypertension who presented to ED following syncopal episode and CASTELLANOS and found to have bilateral PE w/ large clot burden with evidence of R heart strain on CT.      # Massive bilateral PE w/ syncope.  # Hematemesis, likely related to swallowed hemoptysis   CASTELLANOS  prior to admission. CT PE w/ large clot burden with evidence of elevated R heart pressure. US Lower extremity with occlusive and nonocclusive thrombus throughout the L femoral vein. Underwent emergent thrombectomy by IR then placed on Heparin gtt. Drip held shortly morning of 3/29 due to vomiting blood, per STAT GI consult unlikely acute GI bleed thus gtt resumed. Code blue called due to subsequent syncopal episode 3/29 while on the commode. Tele showed sinus bradycardia with HR in 50-60s.Troponin peaked 0.62 and BNP 1263. Lactate checked at the time 3.9-->2.5 after 500 LR fluid bolus, trended to normal. Hgb trended and stable.   Had long conversation regarding long term AC plan, specifically cost. Pt elected to start apixaban. Heparin gtt stopped 3/30.   - Continue Eliquis 10 mg BID x 6 days followed by 5 mg BID   - Continue PPI BID PO x 2 months  - Follow up with hematology in 3 months  - Repeat ECHO in 4 weeks, If RV dysfunction or pulmonary hypertension seen on repeat ECHO, then please refer patient to Dr. Alexy Lebron for CTEPH evaluation.    REANNA on CKD. Bl creatinine around 1.3-1.4. 1.6 on admission. Likely prerenal as improved with fluids.     Hypothyroidism. Continue synthroid.     Hypertension. Continue lisinopril, which was initially held on admission.      ANCA on CPAP. Continue CPAP at night.      GERD  Hx of large hiatal hernia  Oral PPI BID x 2 months.     Psoriasis. Continue emollient crm as needed.     Consultations This Hospital Stay   PHARMACY IP CONSULT  PHARMACY IP CONSULT  VASCULAR ACCESS CARE ADULT IP CONSULT  VASCULAR ACCESS CARE ADULT IP CONSULT  GI LUMINAL ADULT IP CONSULT  PHARMACY TO DOSE WARFARIN  VASCULAR ACCESS CARE ADULT IP CONSULT  HEMATOLOGY ADULT IP CONSULT    Code Status   Full Code    Time Spent on this Encounter   I, Mabel Cervantes PA-C, personally saw the patient today and spent greater than 30 minutes discharging this patient.       Mabel Cervantes PA-C  [facility]  syncope [facility] [address]  [address] [place] [address]  Phone: [phone]  ______________________________________________________________________    Physical Exam   Vital Signs: Temp: 98.2  F (36.8  C) Temp src: Oral BP: 103/62 Pulse: 88   Resp: 18 SpO2: 98 % O2 Device: None (Room air) Oxygen Delivery: 2 LPM  Weight: 259 lbs 14.76 oz  General Appearance: Alert and oriented x 3, NAD  Respiratory: Lungs CTAB, no wheezing or crackles  Cardiovascular: RRR, no murmurs auscultated  GI: Abdomen rotund, non tender to palpation, BS+  Skin: warm and dry to touch  Other: No peripheral edema        Primary Care Physician   Jovon Deutsch    Discharge Orders      Reason for your hospital stay    You were hospitalized with a large pulmonary embolization that required thrombectomy and treatment with anticoagulation.     Adult [facility] Follow-up and recommended labs and tests    Follow up with primary care provider, Jovon Deutsch, as soon as possible. You will need a repeat echo in 4 weeks time. This can be ordered by your PCP.  Follow up with hematology in 3 months.    Appointments on [place] and/or [facility] (with [facility] or [facility] provider or service). Call [phone] if you haven't heard regarding these appointments within 7 days of discharge.     Activity    Your activity upon discharge: activity as tolerated     When to contact your care team    Call your primary doctor if you have any of the following: temperature greater than 102 or less than 95, chest pain, shortness of breath, difficulty breathing, loss of consciousness, or passing out.     Follow Up and recommended labs and tests    Repeat ECHO in 4 weeks     Diet    Follow this diet upon discharge: Orders Placed This Encounter      Advance Diet as Tolerated: Regular Diet Adult       Significant Results and Procedures   Most Recent 3 CBC's:  Recent Labs   Lab Test 03/31/21  1432 03/31/21  0534 03/30/21  0959 03/30/21  0409   WBC 6.8 5.9  --  10.5   HGB  8.7* 8.3* 9.5* 10.3*   MCV 98 98  --  100   * 106*  --  138*     Most Recent 3 BMP's:  Recent Labs   Lab Test 03/31/21  1149 03/31/21  0534 03/30/21  0409 03/29/21  0940   NA  --  136 136 137   POTASSIUM  --  4.1 4.1 4.0   CHLORIDE  --  106 106 108   CO2  --  24 21 20   BUN  --  25 24 25   CR 1.42* 1.61* 1.30* 1.49*   ANIONGAP  --  6 9 9   MAURICIO  --  8.1* 8.4* 8.1*   GLC  --  115* 131* 193*       Discharge Medications   Current Discharge Medication List      START taking these medications    Details   !! apixaban ANTICOAGULANT (ELIQUIS) 5 MG tablet Take 2 tablets (10 mg) by mouth 2 times daily for 11 doses  Qty: 22 tablet, Refills: 0    Associated Diagnoses: PE (pulmonary thromboembolism) (H)      !! apixaban ANTICOAGULANT (ELIQUIS) 5 MG tablet Take 1 tablet (5 mg) by mouth 2 times daily  Qty: 60 tablet, Refills: 0    Associated Diagnoses: PE (pulmonary thromboembolism) (H)       !! - Potential duplicate medications found. Please discuss with provider.      CONTINUE these medications which have NOT CHANGED    Details   levothyroxine (SYNTHROID/LEVOTHROID) 75 MCG tablet TAKE ONE TABLET BY MOUTH ONE TIME DAILY      lisinopril (ZESTRIL) 20 MG tablet Take 20 mg by mouth         STOP taking these medications       omeprazole (PRILOSEC) 20 MG DR capsule Comments:   Reason for Stopping:         PREVACID 15 MG OR CPDR Comments:   Reason for Stopping:             Allergies   No Known Allergies

## 2021-03-31 NOTE — PLAN OF CARE
Shift: 6368-6348    Status: Transferred from OSH on 3/29 for massive PE. Heparin gtt stopped 3/30 and eliquis started. History significant for obesity, ANCA, HTN, GERD, prostate CA s/p radiation in 2019.     Neuro: Alert and oriented x4, able to make needs known, calls appropriately  Cardiac/VS: Sinus rhythm 80's-90's overnight, BP stable  Respiratory: 2L NC overnight   GI: Denies N/V  Diet/Appetite: Regular  : Voids spontaneously  Activity: Ax1  Pain: Denies pain  Skin: No new deficits   LDA(s): PIV SL        Plan: Continue plan of care

## 2021-03-31 NOTE — PROGRESS NOTES
Patient has been assessed for Home Oxygen needs. Oxygen readings:    *Pulse oximetry (SpO2) = 95% on room air at rest while awake.    *SpO2 improved to 95% on 0liters/minute at rest.    *SpO2 = 92% on room air during activity/with exercise.    *SpO2 improved to 92% on 0liters/minute during activity/with exercise.    Patient walked approximately 250ft on RA in hallway, he was talking the entire time and maintained 92% oxygen saturation on RA. Upon return to his room and while sitting the patient's O2 saturation returned to 95% on RA. He demonstrated some CASTELLANOS but seem to maintain and denied SOB when walking.

## 2021-04-01 LAB
B2 GLYCOPROT1 IGG SERPL IA-ACNC: 1.5 U/ML
B2 GLYCOPROT1 IGM SERPL IA-ACNC: <2.9 U/ML
CARDIOLIPIN ANTIBODY IGG: <1.6 GPL-U/ML (ref 0–19.9)
CARDIOLIPIN ANTIBODY IGM: 3.2 MPL-U/ML (ref 0–19.9)
COPATH REPORT: NORMAL

## 2021-04-01 NOTE — RESULT ENCOUNTER NOTE
Result routed to the hospitalist pool after discharge. This result is stable, no further action needed at this time. Will route this to hematology team

## 2021-04-01 NOTE — RESULT ENCOUNTER NOTE
Result routed to the hospitalist pool after discharge. This result is stable, no further action needed at this time.

## 2021-04-02 NOTE — PROGRESS NOTES
Attempted to contact the patient x3 for post-hospital call without answer. Will close encounter at this time.    Daniela Rodriguez CMA, [facility]  Post Hospital Discharge Team

## 2021-04-16 ENCOUNTER — TELEPHONE (OUTPATIENT)
Dept: HEMATOLOGY | Facility: CLINIC | Age: 71
End: 2021-04-16

## 2022-06-07 ENCOUNTER — HOSPITAL ENCOUNTER (EMERGENCY)
Facility: CLINIC | Age: 72
Discharge: HOME | End: 2022-06-07
Attending: EMERGENCY MEDICINE | Admitting: EMERGENCY MEDICINE
Payer: COMMERCIAL

## 2022-06-07 VITALS
DIASTOLIC BLOOD PRESSURE: 84 MMHG | OXYGEN SATURATION: 91 % | SYSTOLIC BLOOD PRESSURE: 155 MMHG | HEART RATE: 73 BPM | TEMPERATURE: 98 F | RESPIRATION RATE: 20 BRPM

## 2022-06-07 DIAGNOSIS — I82.402 ACUTE DEEP VEIN THROMBOSIS (DVT) OF LEFT LOWER EXTREMITY, UNSPECIFIED VEIN (H): ICD-10-CM

## 2022-06-07 LAB
ANION GAP SERPL CALCULATED.3IONS-SCNC: 4 MMOL/L (ref 3–14)
BASOPHILS # BLD AUTO: 0 10E3/UL (ref 0–0.2)
BASOPHILS NFR BLD AUTO: 0 %
BUN SERPL-MCNC: 21 MG/DL (ref 7–30)
CALCIUM SERPL-MCNC: 8.9 MG/DL (ref 8.5–10.1)
CHLORIDE BLD-SCNC: 107 MMOL/L (ref 94–109)
CO2 SERPL-SCNC: 27 MMOL/L (ref 20–32)
CREAT SERPL-MCNC: 1.07 MG/DL (ref 0.66–1.25)
EOSINOPHIL # BLD AUTO: 0.2 10E3/UL (ref 0–0.7)
EOSINOPHIL NFR BLD AUTO: 3 %
ERYTHROCYTE [DISTWIDTH] IN BLOOD BY AUTOMATED COUNT: 13.7 % (ref 10–15)
GFR SERPL CREATININE-BSD FRML MDRD: 74 ML/MIN/1.73M2
GLUCOSE BLD-MCNC: 162 MG/DL (ref 70–99)
HCT VFR BLD AUTO: 45 % (ref 40–53)
HGB BLD-MCNC: 14.4 G/DL (ref 13.3–17.7)
HOLD SPECIMEN: NORMAL
HOLD SPECIMEN: NORMAL
IMM GRANULOCYTES # BLD: 0 10E3/UL
IMM GRANULOCYTES NFR BLD: 0 %
LYMPHOCYTES # BLD AUTO: 1.7 10E3/UL (ref 0.8–5.3)
LYMPHOCYTES NFR BLD AUTO: 24 %
MCH RBC QN AUTO: 33.4 PG (ref 26.5–33)
MCHC RBC AUTO-ENTMCNC: 32 G/DL (ref 31.5–36.5)
MCV RBC AUTO: 104 FL (ref 78–100)
MONOCYTES # BLD AUTO: 0.5 10E3/UL (ref 0–1.3)
MONOCYTES NFR BLD AUTO: 8 %
NEUTROPHILS # BLD AUTO: 4.5 10E3/UL (ref 1.6–8.3)
NEUTROPHILS NFR BLD AUTO: 65 %
NRBC # BLD AUTO: 0 10E3/UL
NRBC BLD AUTO-RTO: 0 /100
PLATELET # BLD AUTO: 146 10E3/UL (ref 150–450)
POTASSIUM BLD-SCNC: 3.8 MMOL/L (ref 3.4–5.3)
RBC # BLD AUTO: 4.31 10E6/UL (ref 4.4–5.9)
SODIUM SERPL-SCNC: 138 MMOL/L (ref 133–144)
WBC # BLD AUTO: 7 10E3/UL (ref 4–11)

## 2022-06-07 PROCEDURE — 250N000013 HC RX MED GY IP 250 OP 250 PS 637: Performed by: EMERGENCY MEDICINE

## 2022-06-07 PROCEDURE — 99283 EMERGENCY DEPT VISIT LOW MDM: CPT

## 2022-06-07 PROCEDURE — 85014 HEMATOCRIT: CPT | Performed by: EMERGENCY MEDICINE

## 2022-06-07 PROCEDURE — 82310 ASSAY OF CALCIUM: CPT | Performed by: EMERGENCY MEDICINE

## 2022-06-07 PROCEDURE — 36415 COLL VENOUS BLD VENIPUNCTURE: CPT | Performed by: EMERGENCY MEDICINE

## 2022-06-07 RX ORDER — APIXABAN 5 MG (74)
KIT ORAL
Qty: 74 EACH | Refills: 0 | Status: SHIPPED | OUTPATIENT
Start: 2022-06-07 | End: 2022-07-07

## 2022-06-07 RX ADMIN — APIXABAN 10 MG: 5 TABLET, FILM COATED ORAL at 13:39

## 2022-06-07 ASSESSMENT — ENCOUNTER SYMPTOMS
CHILLS: 0
FEVER: 0
SHORTNESS OF BREATH: 0
COUGH: 0

## 2022-06-07 NOTE — DISCHARGE INSTRUCTIONS
Keep your left leg elevated as much as possible.    Consider avoiding riding your bike while you are on the Eliquis, since you could fall and have increased traumatic bleeding complications.  Discuss this with your medical provider/doctor when you follow-up later this week

## 2022-06-07 NOTE — LETTER
June 7, 2022      To Whom It May Concern:      Brian Bowers was seen in our Emergency Department today, 06/07/22.  I expect his condition to improve over the next 1-2 weeks.  He may return to work on 6/10/22, but please allow him to perform seated work for the next 2 weeks.    Sincerely,        Vida Ruano MD

## 2022-06-07 NOTE — ED TRIAGE NOTES
Patient referred from urgent care for blood clot. Patient having right sided leg pain. Blood clot found in left groin. Patient with history of PEs.  Patient not on blood thinners. ABCs intact.

## 2022-06-07 NOTE — ED PROVIDER NOTES
History   Chief Complaint:  Deep Vein Thrombosis     The history is provided by the patient.      Brian Bowers is a 72 year old male with history of saddle PE who presents with DVT. Per the patient, he went to the  on 5/14/22 for pain in his right leg. Four days later his right elbow and both legs began cramping. Since then he has had constant pain in his upper right leg. This pain is relieved by sitting. He went into the  today for a doctor's note to sit at work to relieve his pain. There a blood clot was discovered in his left leg on an US for which he was directed to the ED. He reports a history of blood clots for which he was formerly on Eliquis. He denies any chest pain, shortness of breath, fevers, chills or cough.     US Venous Bilateral Lower Extremity Doppler from 6/7/22   1. Occlusive thrombus in the left femoral vein at the proximal and mid aspect.   2. Small left popliteal fossa cyst.           Review of Systems   Constitutional: Negative for chills and fever.   Respiratory: Negative for cough and shortness of breath.    Cardiovascular: Negative for chest pain.   Musculoskeletal:        Positive for Pain in Right Leg.      Allergies:  The patient has no known allergies.     Medications:  Levothyroxine  Lisinopril  Pantoprazole    Past Medical History:     Basal Cell Carcinoma  Dyspepsia  GERD  Hypertension  Hypogonadism  Hypothyroidism  Obesity  Prostate Cancer  Psoriasis  Saddle PE  Sleep Apnea    Past Surgical History:    Inguinal Hernia Repair, bilateral  IR Pulmonary Angiogram Bilateral  Tonsillectomy  Vasectomy    Family History:    Prostate Cancer - Father  Arthritis, Emphysema - Mother    Social History:  Patient is unaccompanied.  Patient recently bought an electric bike.     Physical Exam     Patient Vitals for the past 24 hrs:   BP Temp Temp src Pulse Resp SpO2   06/07/22 1247 (!) 183/93 98  F (36.7  C) Oral 79 20 96 %   06/07/22 1222 (!) 183/83 -- -- -- -- --   06/07/22 1221 -- 97.3  F  (36.3  C) Temporal 77 18 95 %       Physical Exam   Nursing note and vitals reviewed.  Constitutional:  Appears well-developed and well-nourished.   HENT:   Head:    Atraumatic.   Mouth/Throat:   Oropharynx is clear and moist. No oropharyngeal exudate.   Eyes:    Pupils are equal, round, and reactive to light.   Neck:    Normal range of motion. Neck supple.      No tracheal deviation present. No thyromegaly present.   Cardiovascular:  Normal rate, regular rhythm, no murmur   Pulmonary/Chest: Breath sounds are clear and equal without wheezes or crackles.  Abdominal:   Soft. Bowel sounds are normal. Exhibits no distension and      no mass. There is no tenderness.      There is no rebound and no guarding.   Musculoskeletal:  Exhibits no edema. Legs both look symmetrical. No redness or warmth. He has good dorsalis pedis pulses bilaterally. Minimal chronic venous stasis changes.   Lymphadenopathy:  No cervical adenopathy.   Neurological:   Alert and oriented to person, place, and time.   Skin:    Skin is warm and dry. No rash noted. No pallor.       Emergency Department Course   Laboratory:  Labs Ordered and Resulted from Time of ED Arrival to Time of ED Departure   BASIC METABOLIC PANEL - Abnormal       Result Value    Sodium 138      Potassium 3.8      Chloride 107      Carbon Dioxide (CO2) 27      Anion Gap 4      Urea Nitrogen 21      Creatinine 1.07      Calcium 8.9      Glucose 162 (*)     GFR Estimate 74     CBC WITH PLATELETS AND DIFFERENTIAL - Abnormal    WBC Count 7.0      RBC Count 4.31 (*)     Hemoglobin 14.4      Hematocrit 45.0       (*)     MCH 33.4 (*)     MCHC 32.0      RDW 13.7      Platelet Count 146 (*)     % Neutrophils 65      % Lymphocytes 24      % Monocytes 8      % Eosinophils 3      % Basophils 0      % Immature Granulocytes 0      NRBCs per 100 WBC 0      Absolute Neutrophils 4.5      Absolute Lymphocytes 1.7      Absolute Monocytes 0.5      Absolute Eosinophils 0.2      Absolute  Basophils 0.0      Absolute Immature Granulocytes 0.0      Absolute NRBCs 0.0        Emergency Department Course:    Reviewed:  I reviewed nursing notes, vitals and past medical history    Assessments:  1251 I obtained history and examined the patient as noted above.     Interventions:  1339 apixaban ANTICOAGULANT (ELIQUIS) tablet 10 mg orally     Disposition:  The patient was discharged to home.     Impression & Plan   Medical Decision Making:  Brian Bowers is a 72 year old male who presents for evaluation of known DVT diagnosed at  this morning. This was discussed with the patient. There are no symptoms to suggest this has progressed to pulmonary embolism. Labs are unremarkable. First dose of Eliquis were initiated after discussion with the patient after clarification of any contraindications to this therapy. There are none but patient understands the risk/benefit ratio to this therapy and the possibility of serious and/or life-threatening hemorrhage. No signs of cellulitis.  Normal arterial pulses.  There is no indication at this point for thrombolysis or contacting the interventional team for directed thrombolytics. I discourage riding your bike right for now while on the Eliquis to avoid any traumatic injury and to discuss this with your PMD. Patient is stable and amenable to discharge home. All questions answered.    Diagnosis:    ICD-10-CM    1. Acute deep vein thrombosis (DVT) of left lower extremity, unspecified vein (H)  I82.402        Discharge Medications:  New Prescriptions    APIXABAN STARTER PACK (ELIQUIS DVT/PE STARTER PACK) 5 MG TBPK    Take 10 mg by mouth 2 times daily for 7 days, THEN 5 mg 2 times daily for 23 days.       Scribe Disclosure:  RYLAND, Felix Argueta & Adri Chappell, am serving as a scribe at 12:34 PM on 6/7/2022 to document services personally performed by Vida Ruano MD based on my observations and the provider's statements to me.            Vida Ruano MD  06/07/22  [id number]

## 2022-06-07 NOTE — LETTER
June 7, 2022      To Whom It May Concern:      Brian Bowers was seen in our Emergency Department today, 06/07/22.  I expect his condition to improve over the next 2 days.  He may return to work/school when improved.    Sincerely,        Milena Barnard RN

## 2023-01-01 NOTE — PRE-PROCEDURE
GENERAL PRE-PROCEDURE:   Procedure:  Mechanical thrombectomy & possible catheter guided thrombolysis for PE.   Date/Time:  3/29/2021 1:54 AM    Verbal consent obtained?: Yes    Written consent obtained?: Yes    Risks and benefits: Risks, benefits and alternatives were discussed    Consent given by:  Patient  Patient states understanding of procedure being performed: Yes    Patient's understanding of procedure matches consent: Yes    Procedure consent matches procedure scheduled: Yes    Expected level of sedation:  Moderate  Appropriately NPO:  Yes  ASA Class:  Class 2- mild systemic disease, no acute problems, no functional limitations  Mallampati  :  Grade 2- soft palate, base of uvula, tonsillar pillars, and portion of posterior pharyngeal wall visible  Lungs:  Lungs clear with good breath sounds bilaterally  Heart:  Normal heart sounds and rate  History & Physical reviewed:  History and physical reviewed and no updates needed  Statement of review:  I have reviewed the lab findings, diagnostic data, medications, and the plan for sedation     Yes

## (undated) RX ORDER — FENTANYL CITRATE 50 UG/ML
INJECTION, SOLUTION INTRAMUSCULAR; INTRAVENOUS
Status: DISPENSED
Start: 2021-03-29

## (undated) RX ORDER — HEPARIN SODIUM 200 [USP'U]/100ML
INJECTION, SOLUTION INTRAVENOUS
Status: DISPENSED
Start: 2021-03-29

## (undated) RX ORDER — HEPARIN SODIUM 1000 [USP'U]/ML
INJECTION, SOLUTION INTRAVENOUS; SUBCUTANEOUS
Status: DISPENSED
Start: 2021-03-29

## (undated) RX ORDER — LIDOCAINE HYDROCHLORIDE 10 MG/ML
INJECTION, SOLUTION EPIDURAL; INFILTRATION; INTRACAUDAL; PERINEURAL
Status: DISPENSED
Start: 2021-03-29